# Patient Record
Sex: MALE | Race: WHITE | NOT HISPANIC OR LATINO | Employment: OTHER | URBAN - METROPOLITAN AREA
[De-identification: names, ages, dates, MRNs, and addresses within clinical notes are randomized per-mention and may not be internally consistent; named-entity substitution may affect disease eponyms.]

---

## 2017-01-24 LAB — CRC RECOMMENDATION EXT: NORMAL

## 2017-02-13 ENCOUNTER — HISTORICAL (OUTPATIENT)
Dept: ADMINISTRATIVE | Facility: HOSPITAL | Age: 67
End: 2017-02-13

## 2018-02-14 ENCOUNTER — HISTORICAL (OUTPATIENT)
Dept: ADMINISTRATIVE | Facility: HOSPITAL | Age: 68
End: 2018-02-14

## 2018-06-15 ENCOUNTER — HISTORICAL (OUTPATIENT)
Dept: ADMINISTRATIVE | Facility: HOSPITAL | Age: 68
End: 2018-06-15

## 2018-06-15 LAB
ABS NEUT (OLG): 6.65 X10(3)/MCL (ref 2.1–9.2)
ALBUMIN SERPL-MCNC: 4.1 GM/DL (ref 3.4–5)
ALBUMIN/GLOB SERPL: 1.3 {RATIO}
ALP SERPL-CCNC: 89 UNIT/L (ref 50–136)
ALT SERPL-CCNC: 10 UNIT/L (ref 12–78)
APPEARANCE, UA: ABNORMAL
AST SERPL-CCNC: 16 UNIT/L (ref 15–37)
BACTERIA SPEC CULT: ABNORMAL /HPF
BASOPHILS # BLD AUTO: 0 X10(3)/MCL (ref 0–0.2)
BASOPHILS NFR BLD AUTO: 0 %
BILIRUB SERPL-MCNC: 0.8 MG/DL (ref 0.2–1)
BILIRUB UR QL STRIP: ABNORMAL
BILIRUBIN DIRECT+TOT PNL SERPL-MCNC: 0.2 MG/DL (ref 0–0.2)
BILIRUBIN DIRECT+TOT PNL SERPL-MCNC: 0.6 MG/DL (ref 0–0.8)
BUN SERPL-MCNC: 16 MG/DL (ref 7–18)
CALCIUM SERPL-MCNC: 8.8 MG/DL (ref 8.5–10.1)
CHLORIDE SERPL-SCNC: 106 MMOL/L (ref 98–107)
CHOLEST SERPL-MCNC: 170 MG/DL (ref 0–200)
CHOLEST/HDLC SERPL: 3.9 {RATIO} (ref 0–5)
CO2 SERPL-SCNC: 28 MMOL/L (ref 21–32)
COLOR UR: ABNORMAL
CREAT SERPL-MCNC: 1.17 MG/DL (ref 0.7–1.3)
EOSINOPHIL # BLD AUTO: 0.2 X10(3)/MCL (ref 0–0.9)
EOSINOPHIL NFR BLD AUTO: 2 %
ERYTHROCYTE [DISTWIDTH] IN BLOOD BY AUTOMATED COUNT: 12.7 % (ref 11.5–17)
GLOBULIN SER-MCNC: 3.2 GM/DL (ref 2.4–3.5)
GLUCOSE (UA): NEGATIVE
GLUCOSE SERPL-MCNC: 86 MG/DL (ref 74–106)
HCT VFR BLD AUTO: 44.6 % (ref 42–52)
HDLC SERPL-MCNC: 44 MG/DL (ref 35–60)
HGB BLD-MCNC: 14.2 GM/DL (ref 14–18)
HGB UR QL STRIP: NEGATIVE
KETONES UR QL STRIP: ABNORMAL
LDLC SERPL CALC-MCNC: 105 MG/DL (ref 0–129)
LEUKOCYTE ESTERASE UR QL STRIP: ABNORMAL
LYMPHOCYTES # BLD AUTO: 1.6 X10(3)/MCL (ref 0.6–4.6)
LYMPHOCYTES NFR BLD AUTO: 18 %
MCH RBC QN AUTO: 29.8 PG (ref 27–31)
MCHC RBC AUTO-ENTMCNC: 31.8 GM/DL (ref 33–36)
MCV RBC AUTO: 93.5 FL (ref 80–94)
MONOCYTES # BLD AUTO: 0.5 X10(3)/MCL (ref 0.1–1.3)
MONOCYTES NFR BLD AUTO: 6 %
NEUTROPHILS # BLD AUTO: 6.65 X10(3)/MCL (ref 2.1–9.2)
NEUTROPHILS NFR BLD AUTO: 74 %
NITRITE UR QL STRIP: POSITIVE
PH UR STRIP: 6 [PH] (ref 5–9)
PLATELET # BLD AUTO: 216 X10(3)/MCL (ref 130–400)
PMV BLD AUTO: 9.8 FL (ref 9.4–12.4)
POTASSIUM SERPL-SCNC: 4.2 MMOL/L (ref 3.5–5.1)
PROT SERPL-MCNC: 7.3 GM/DL (ref 6.4–8.2)
PROT UR QL STRIP: ABNORMAL
RBC # BLD AUTO: 4.77 X10(6)/MCL (ref 4.7–6.1)
RBC #/AREA URNS HPF: ABNORMAL /[HPF]
SODIUM SERPL-SCNC: 143 MMOL/L (ref 136–145)
SP GR UR STRIP: 1.02 (ref 1–1.03)
SQUAMOUS EPITHELIAL, UA: ABNORMAL
TRIGL SERPL-MCNC: 106 MG/DL (ref 30–150)
TSH SERPL-ACNC: 1.62 MIU/L (ref 0.36–3.74)
UROBILINOGEN UR STRIP-ACNC: 0.2
VLDLC SERPL CALC-MCNC: 21 MG/DL
WBC # SPEC AUTO: 9 X10(3)/MCL (ref 4.5–11.5)
WBC #/AREA URNS HPF: ABNORMAL /HPF

## 2019-02-20 ENCOUNTER — HISTORICAL (OUTPATIENT)
Dept: ADMINISTRATIVE | Facility: HOSPITAL | Age: 69
End: 2019-02-20

## 2019-02-20 LAB
ABS NEUT (OLG): 6.88 X10(3)/MCL (ref 2.1–9.2)
ALBUMIN SERPL-MCNC: 3.8 GM/DL (ref 3.4–5)
ALBUMIN/GLOB SERPL: 1.2 RATIO (ref 1.1–2)
ALP SERPL-CCNC: 123 UNIT/L (ref 50–136)
ALT SERPL-CCNC: 19 UNIT/L (ref 12–78)
APPEARANCE, UA: CLEAR
AST SERPL-CCNC: 13 UNIT/L (ref 15–37)
BACTERIA SPEC CULT: ABNORMAL /HPF
BASOPHILS # BLD AUTO: 0.1 X10(3)/MCL (ref 0–0.2)
BASOPHILS NFR BLD AUTO: 1 %
BILIRUB SERPL-MCNC: 0.4 MG/DL (ref 0.2–1)
BILIRUB UR QL STRIP: NEGATIVE
BILIRUBIN DIRECT+TOT PNL SERPL-MCNC: 0.1 MG/DL (ref 0–0.5)
BILIRUBIN DIRECT+TOT PNL SERPL-MCNC: 0.3 MG/DL (ref 0–0.8)
BUN SERPL-MCNC: 21 MG/DL (ref 7–18)
CALCIUM SERPL-MCNC: 8.9 MG/DL (ref 8.5–10.1)
CHLORIDE SERPL-SCNC: 107 MMOL/L (ref 98–107)
CHOLEST SERPL-MCNC: 180 MG/DL (ref 0–200)
CHOLEST/HDLC SERPL: 3.8 {RATIO} (ref 0–5)
CO2 SERPL-SCNC: 29 MMOL/L (ref 21–32)
COLOR UR: YELLOW
CREAT SERPL-MCNC: 1.28 MG/DL (ref 0.7–1.3)
EOSINOPHIL # BLD AUTO: 0.3 X10(3)/MCL (ref 0–0.9)
EOSINOPHIL NFR BLD AUTO: 3 %
ERYTHROCYTE [DISTWIDTH] IN BLOOD BY AUTOMATED COUNT: 13 % (ref 11.5–17)
GLOBULIN SER-MCNC: 3.2 GM/DL (ref 2.4–3.5)
GLUCOSE (UA): NEGATIVE
GLUCOSE SERPL-MCNC: 109 MG/DL (ref 74–106)
HCT VFR BLD AUTO: 41.2 % (ref 42–52)
HDLC SERPL-MCNC: 47 MG/DL (ref 35–60)
HGB BLD-MCNC: 13 GM/DL (ref 14–18)
HGB UR QL STRIP: NEGATIVE
KETONES UR QL STRIP: ABNORMAL
LDLC SERPL CALC-MCNC: 112 MG/DL (ref 0–129)
LEUKOCYTE ESTERASE UR QL STRIP: NEGATIVE
LYMPHOCYTES # BLD AUTO: 1.4 X10(3)/MCL (ref 0.6–4.6)
LYMPHOCYTES NFR BLD AUTO: 15 %
MCH RBC QN AUTO: 29.7 PG (ref 27–31)
MCHC RBC AUTO-ENTMCNC: 31.6 GM/DL (ref 33–36)
MCV RBC AUTO: 94.3 FL (ref 80–94)
MONOCYTES # BLD AUTO: 0.7 X10(3)/MCL (ref 0.1–1.3)
MONOCYTES NFR BLD AUTO: 7 %
NEUTROPHILS # BLD AUTO: 6.88 X10(3)/MCL (ref 2.1–9.2)
NEUTROPHILS NFR BLD AUTO: 74 %
NITRITE UR QL STRIP: NEGATIVE
PH UR STRIP: 6.5 [PH] (ref 5–9)
PLATELET # BLD AUTO: 219 X10(3)/MCL (ref 130–400)
PMV BLD AUTO: 10.3 FL (ref 9.4–12.4)
POTASSIUM SERPL-SCNC: 4.7 MMOL/L (ref 3.5–5.1)
PROT SERPL-MCNC: 7 GM/DL (ref 6.4–8.2)
PROT UR QL STRIP: NEGATIVE
RBC # BLD AUTO: 4.37 X10(6)/MCL (ref 4.7–6.1)
RBC #/AREA URNS HPF: ABNORMAL /[HPF]
SODIUM SERPL-SCNC: 140 MMOL/L (ref 136–145)
SP GR UR STRIP: 1.03 (ref 1–1.03)
SQUAMOUS EPITHELIAL, UA: ABNORMAL
T4 FREE SERPL-MCNC: 0.96 NG/DL (ref 0.76–1.46)
TRIGL SERPL-MCNC: 105 MG/DL (ref 30–150)
TSH SERPL-ACNC: 2.24 MIU/L (ref 0.36–3.74)
UROBILINOGEN UR STRIP-ACNC: 1
VLDLC SERPL CALC-MCNC: 21 MG/DL
WBC # SPEC AUTO: 9.3 X10(3)/MCL (ref 4.5–11.5)
WBC #/AREA URNS HPF: ABNORMAL /HPF

## 2019-06-25 ENCOUNTER — HISTORICAL (OUTPATIENT)
Dept: ADMINISTRATIVE | Facility: HOSPITAL | Age: 69
End: 2019-06-25

## 2019-06-25 LAB
ABS NEUT (OLG): 7.37 X10(3)/MCL (ref 2.1–9.2)
ALBUMIN SERPL-MCNC: 3.9 GM/DL (ref 3.4–5)
ALBUMIN/GLOB SERPL: 1.3 {RATIO}
ALP SERPL-CCNC: 132 UNIT/L (ref 50–136)
ALT SERPL-CCNC: 8 UNIT/L (ref 12–78)
APPEARANCE, UA: CLEAR
AST SERPL-CCNC: 17 UNIT/L (ref 15–37)
BACTERIA SPEC CULT: ABNORMAL /HPF
BASOPHILS # BLD AUTO: 0.1 X10(3)/MCL (ref 0–0.2)
BASOPHILS NFR BLD AUTO: 1 %
BILIRUB SERPL-MCNC: 0.7 MG/DL (ref 0.2–1)
BILIRUB UR QL STRIP: NEGATIVE
BILIRUBIN DIRECT+TOT PNL SERPL-MCNC: 0.1 MG/DL (ref 0–0.2)
BILIRUBIN DIRECT+TOT PNL SERPL-MCNC: 0.6 MG/DL (ref 0–0.8)
BUN SERPL-MCNC: 18 MG/DL (ref 7–18)
CALCIUM SERPL-MCNC: 8.9 MG/DL (ref 8.5–10.1)
CHLORIDE SERPL-SCNC: 105 MMOL/L (ref 98–107)
CHOLEST SERPL-MCNC: 172 MG/DL (ref 0–200)
CHOLEST/HDLC SERPL: 4.1 {RATIO} (ref 0–5)
CO2 SERPL-SCNC: 26 MMOL/L (ref 21–32)
COLOR UR: YELLOW
CREAT SERPL-MCNC: 1.28 MG/DL (ref 0.7–1.3)
EOSINOPHIL # BLD AUTO: 0.3 X10(3)/MCL (ref 0–0.9)
EOSINOPHIL NFR BLD AUTO: 3 %
ERYTHROCYTE [DISTWIDTH] IN BLOOD BY AUTOMATED COUNT: 13.2 % (ref 11.5–17)
EST. AVERAGE GLUCOSE BLD GHB EST-MCNC: 126 MG/DL
GLOBULIN SER-MCNC: 3.1 GM/DL (ref 2.4–3.5)
GLUCOSE (UA): NEGATIVE
GLUCOSE SERPL-MCNC: 97 MG/DL (ref 74–106)
HBA1C MFR BLD: 6 % (ref 4.2–6.3)
HCT VFR BLD AUTO: 41.5 % (ref 42–52)
HDLC SERPL-MCNC: 42 MG/DL (ref 35–60)
HGB BLD-MCNC: 13.2 GM/DL (ref 14–18)
HGB UR QL STRIP: NEGATIVE
KETONES UR QL STRIP: ABNORMAL
LDLC SERPL CALC-MCNC: 100 MG/DL (ref 0–129)
LEUKOCYTE ESTERASE UR QL STRIP: NEGATIVE
LYMPHOCYTES # BLD AUTO: 1.2 X10(3)/MCL (ref 0.6–4.6)
LYMPHOCYTES NFR BLD AUTO: 13 %
MCH RBC QN AUTO: 28.6 PG (ref 27–31)
MCHC RBC AUTO-ENTMCNC: 31.8 GM/DL (ref 33–36)
MCV RBC AUTO: 90 FL (ref 80–94)
MONOCYTES # BLD AUTO: 0.6 X10(3)/MCL (ref 0.1–1.3)
MONOCYTES NFR BLD AUTO: 6 %
NEUTROPHILS # BLD AUTO: 7.37 X10(3)/MCL (ref 2.1–9.2)
NEUTROPHILS NFR BLD AUTO: 77 %
NITRITE UR QL STRIP: NEGATIVE
PH UR STRIP: 5.5 [PH] (ref 5–9)
PLATELET # BLD AUTO: 219 X10(3)/MCL (ref 130–400)
PMV BLD AUTO: 10 FL (ref 9.4–12.4)
POTASSIUM SERPL-SCNC: 4.6 MMOL/L (ref 3.5–5.1)
PROT SERPL-MCNC: 7 GM/DL (ref 6.4–8.2)
PROT UR QL STRIP: NEGATIVE
RBC # BLD AUTO: 4.61 X10(6)/MCL (ref 4.7–6.1)
RBC #/AREA URNS HPF: ABNORMAL /[HPF]
SODIUM SERPL-SCNC: 138 MMOL/L (ref 136–145)
SP GR UR STRIP: 1.02 (ref 1–1.03)
SQUAMOUS EPITHELIAL, UA: ABNORMAL
TRIGL SERPL-MCNC: 152 MG/DL (ref 30–150)
TSH SERPL-ACNC: 1.89 MIU/L (ref 0.36–3.74)
UROBILINOGEN UR STRIP-ACNC: 1
VLDLC SERPL CALC-MCNC: 30 MG/DL
WBC # SPEC AUTO: 9.6 X10(3)/MCL (ref 4.5–11.5)
WBC #/AREA URNS HPF: ABNORMAL /HPF

## 2019-10-04 ENCOUNTER — HISTORICAL (OUTPATIENT)
Dept: ADMINISTRATIVE | Facility: HOSPITAL | Age: 69
End: 2019-10-04

## 2019-10-04 LAB
APPEARANCE, UA: CLEAR
BACTERIA SPEC CULT: ABNORMAL /HPF
BILIRUB UR QL STRIP: NEGATIVE
COLOR UR: YELLOW
GLUCOSE (UA): NEGATIVE
HGB UR QL STRIP: NEGATIVE
KETONES UR QL STRIP: ABNORMAL
LEUKOCYTE ESTERASE UR QL STRIP: NEGATIVE
NITRITE UR QL STRIP: NEGATIVE
PH UR STRIP: 6.5 [PH] (ref 5–9)
PROT UR QL STRIP: NEGATIVE
RBC #/AREA URNS HPF: ABNORMAL /[HPF]
SP GR UR STRIP: 1.02 (ref 1–1.03)
SQUAMOUS EPITHELIAL, UA: ABNORMAL
UROBILINOGEN UR STRIP-ACNC: 1
WBC #/AREA URNS HPF: ABNORMAL /HPF

## 2019-11-27 ENCOUNTER — HISTORICAL (OUTPATIENT)
Dept: ADMINISTRATIVE | Facility: HOSPITAL | Age: 69
End: 2019-11-27

## 2019-11-27 LAB
ABS NEUT (OLG): 7.67 X10(3)/MCL (ref 2.1–9.2)
ALBUMIN SERPL-MCNC: 3.7 GM/DL (ref 3.4–5)
ALBUMIN/GLOB SERPL: 1.2 RATIO (ref 1.1–2)
ALP SERPL-CCNC: 100 UNIT/L (ref 50–136)
ALT SERPL-CCNC: 10 UNIT/L (ref 12–78)
AST SERPL-CCNC: 19 UNIT/L (ref 15–37)
BASOPHILS # BLD AUTO: 0 X10(3)/MCL (ref 0–0.2)
BASOPHILS NFR BLD AUTO: 0 %
BILIRUB SERPL-MCNC: 0.4 MG/DL (ref 0.2–1)
BILIRUBIN DIRECT+TOT PNL SERPL-MCNC: 0.1 MG/DL (ref 0–0.5)
BILIRUBIN DIRECT+TOT PNL SERPL-MCNC: 0.3 MG/DL (ref 0–0.8)
BUN SERPL-MCNC: 20 MG/DL (ref 7–18)
CALCIUM SERPL-MCNC: 8.8 MG/DL (ref 8.5–10.1)
CHLORIDE SERPL-SCNC: 105 MMOL/L (ref 98–107)
CO2 SERPL-SCNC: 27 MMOL/L (ref 21–32)
CREAT SERPL-MCNC: 1.05 MG/DL (ref 0.7–1.3)
EOSINOPHIL # BLD AUTO: 0.2 X10(3)/MCL (ref 0–0.9)
EOSINOPHIL NFR BLD AUTO: 2 %
ERYTHROCYTE [DISTWIDTH] IN BLOOD BY AUTOMATED COUNT: 13.8 % (ref 11.5–17)
GLOBULIN SER-MCNC: 3 GM/DL (ref 2.4–3.5)
GLUCOSE SERPL-MCNC: 108 MG/DL (ref 74–106)
HCT VFR BLD AUTO: 39.8 % (ref 42–52)
HGB BLD-MCNC: 12.5 GM/DL (ref 14–18)
HIV 1+2 AB+HIV1 P24 AG SERPL QL IA: NEGATIVE
LYMPHOCYTES # BLD AUTO: 1.4 X10(3)/MCL (ref 0.6–4.6)
LYMPHOCYTES NFR BLD AUTO: 15 %
MCH RBC QN AUTO: 28.5 PG (ref 27–31)
MCHC RBC AUTO-ENTMCNC: 31.4 GM/DL (ref 33–36)
MCV RBC AUTO: 90.7 FL (ref 80–94)
MONOCYTES # BLD AUTO: 0.5 X10(3)/MCL (ref 0.1–1.3)
MONOCYTES NFR BLD AUTO: 5 %
NEUTROPHILS # BLD AUTO: 7.67 X10(3)/MCL (ref 2.1–9.2)
NEUTROPHILS NFR BLD AUTO: 77 %
PLATELET # BLD AUTO: 192 X10(3)/MCL (ref 130–400)
PMV BLD AUTO: 9.9 FL (ref 9.4–12.4)
POTASSIUM SERPL-SCNC: 4.1 MMOL/L (ref 3.5–5.1)
PROT SERPL-MCNC: 6.7 GM/DL (ref 6.4–8.2)
RBC # BLD AUTO: 4.39 X10(6)/MCL (ref 4.7–6.1)
SODIUM SERPL-SCNC: 140 MMOL/L (ref 136–145)
WBC # SPEC AUTO: 9.9 X10(3)/MCL (ref 4.5–11.5)

## 2019-12-16 ENCOUNTER — HISTORICAL (OUTPATIENT)
Dept: ADMINISTRATIVE | Facility: HOSPITAL | Age: 69
End: 2019-12-16

## 2019-12-16 LAB
ABS NEUT (OLG): 6.05 X10(3)/MCL (ref 2.1–9.2)
ALBUMIN SERPL-MCNC: 3.9 GM/DL (ref 3.4–5)
ALBUMIN/GLOB SERPL: 1.3 {RATIO}
ALP SERPL-CCNC: 101 UNIT/L (ref 50–136)
ALT SERPL-CCNC: 9 UNIT/L (ref 12–78)
APPEARANCE, UA: CLEAR
AST SERPL-CCNC: 13 UNIT/L (ref 15–37)
BACTERIA SPEC CULT: ABNORMAL /HPF
BASOPHILS # BLD AUTO: 0 X10(3)/MCL (ref 0–0.2)
BASOPHILS NFR BLD AUTO: 1 %
BILIRUB SERPL-MCNC: 0.6 MG/DL (ref 0.2–1)
BILIRUB UR QL STRIP: ABNORMAL
BILIRUBIN DIRECT+TOT PNL SERPL-MCNC: 0.1 MG/DL (ref 0–0.2)
BILIRUBIN DIRECT+TOT PNL SERPL-MCNC: 0.5 MG/DL (ref 0–0.8)
BUN SERPL-MCNC: 14 MG/DL (ref 7–18)
CALCIUM SERPL-MCNC: 9.1 MG/DL (ref 8.5–10.1)
CHLORIDE SERPL-SCNC: 105 MMOL/L (ref 98–107)
CHOLEST SERPL-MCNC: 187 MG/DL (ref 0–200)
CHOLEST/HDLC SERPL: 4.9 {RATIO} (ref 0–5)
CO2 SERPL-SCNC: 29 MMOL/L (ref 21–32)
COLOR UR: YELLOW
CREAT SERPL-MCNC: 0.9 MG/DL (ref 0.7–1.3)
EOSINOPHIL # BLD AUTO: 0.3 X10(3)/MCL (ref 0–0.9)
EOSINOPHIL NFR BLD AUTO: 3 %
ERYTHROCYTE [DISTWIDTH] IN BLOOD BY AUTOMATED COUNT: 13.6 % (ref 11.5–17)
EST. AVERAGE GLUCOSE BLD GHB EST-MCNC: 126 MG/DL
GLOBULIN SER-MCNC: 3.1 GM/DL (ref 2.4–3.5)
GLUCOSE (UA): NEGATIVE
GLUCOSE SERPL-MCNC: 97 MG/DL (ref 74–106)
HBA1C MFR BLD: 6 % (ref 4.2–6.3)
HCT VFR BLD AUTO: 40.7 % (ref 42–52)
HDLC SERPL-MCNC: 38 MG/DL (ref 35–60)
HGB BLD-MCNC: 12.6 GM/DL (ref 14–18)
HGB UR QL STRIP: NEGATIVE
KETONES UR QL STRIP: ABNORMAL
LDLC SERPL CALC-MCNC: 122 MG/DL (ref 0–129)
LEUKOCYTE ESTERASE UR QL STRIP: ABNORMAL
LYMPHOCYTES # BLD AUTO: 1.4 X10(3)/MCL (ref 0.6–4.6)
LYMPHOCYTES NFR BLD AUTO: 17 %
MCH RBC QN AUTO: 28.4 PG (ref 27–31)
MCHC RBC AUTO-ENTMCNC: 31 GM/DL (ref 33–36)
MCV RBC AUTO: 91.7 FL (ref 80–94)
MONOCYTES # BLD AUTO: 0.5 X10(3)/MCL (ref 0.1–1.3)
MONOCYTES NFR BLD AUTO: 6 %
NEUTROPHILS # BLD AUTO: 6.05 X10(3)/MCL (ref 2.1–9.2)
NEUTROPHILS NFR BLD AUTO: 73 %
NITRITE UR QL STRIP: NEGATIVE
PH UR STRIP: 8 [PH] (ref 5–9)
PLATELET # BLD AUTO: 216 X10(3)/MCL (ref 130–400)
PMV BLD AUTO: 9.9 FL (ref 9.4–12.4)
POTASSIUM SERPL-SCNC: 4.1 MMOL/L (ref 3.5–5.1)
PROT SERPL-MCNC: 7 GM/DL (ref 6.4–8.2)
PROT UR QL STRIP: ABNORMAL
RBC # BLD AUTO: 4.44 X10(6)/MCL (ref 4.7–6.1)
RBC #/AREA URNS HPF: ABNORMAL /[HPF]
SODIUM SERPL-SCNC: 139 MMOL/L (ref 136–145)
SP GR UR STRIP: 1.02 (ref 1–1.03)
SQUAMOUS EPITHELIAL, UA: ABNORMAL
TRIGL SERPL-MCNC: 137 MG/DL (ref 30–150)
TSH SERPL-ACNC: 0.9 MIU/L (ref 0.36–3.74)
UROBILINOGEN UR STRIP-ACNC: 1
VLDLC SERPL CALC-MCNC: 27 MG/DL
WBC # SPEC AUTO: 8.3 X10(3)/MCL (ref 4.5–11.5)
WBC #/AREA URNS HPF: ABNORMAL /[HPF]

## 2019-12-23 ENCOUNTER — HISTORICAL (OUTPATIENT)
Dept: ADMINISTRATIVE | Facility: HOSPITAL | Age: 69
End: 2019-12-23

## 2019-12-23 LAB — HBV SURFACE AG SERPL QL IA: NEGATIVE

## 2020-09-23 ENCOUNTER — HISTORICAL (OUTPATIENT)
Dept: ADMINISTRATIVE | Facility: HOSPITAL | Age: 70
End: 2020-09-23

## 2020-09-23 LAB
ABS NEUT (OLG): 4.86 X10(3)/MCL (ref 2.1–9.2)
ALBUMIN SERPL-MCNC: 3.8 GM/DL (ref 3.4–5)
ALBUMIN/GLOB SERPL: 1.6 RATIO (ref 1.1–2)
ALP SERPL-CCNC: 114 UNIT/L (ref 40–150)
ALT SERPL-CCNC: 8 UNIT/L (ref 0–55)
APPEARANCE, UA: CLEAR
AST SERPL-CCNC: 13 UNIT/L (ref 5–34)
BACTERIA SPEC CULT: ABNORMAL /HPF
BASOPHILS # BLD AUTO: 0 X10(3)/MCL (ref 0–0.2)
BASOPHILS NFR BLD AUTO: 1 %
BILIRUB SERPL-MCNC: 0.5 MG/DL
BILIRUB UR QL STRIP: NEGATIVE
BILIRUBIN DIRECT+TOT PNL SERPL-MCNC: 0.2 MG/DL (ref 0–0.5)
BILIRUBIN DIRECT+TOT PNL SERPL-MCNC: 0.3 MG/DL (ref 0–0.8)
BUN SERPL-MCNC: 13.2 MG/DL (ref 8.4–25.7)
CALCIUM SERPL-MCNC: 8.4 MG/DL (ref 8.8–10)
CHLORIDE SERPL-SCNC: 104 MMOL/L (ref 98–107)
CHOLEST SERPL-MCNC: 141 MG/DL
CHOLEST/HDLC SERPL: 4 {RATIO} (ref 0–5)
CO2 SERPL-SCNC: 30 MMOL/L (ref 23–31)
COLOR UR: ABNORMAL
CREAT SERPL-MCNC: 0.87 MG/DL (ref 0.73–1.18)
EOSINOPHIL # BLD AUTO: 0.3 X10(3)/MCL (ref 0–0.9)
EOSINOPHIL NFR BLD AUTO: 4 %
ERYTHROCYTE [DISTWIDTH] IN BLOOD BY AUTOMATED COUNT: 12.9 % (ref 11.5–17)
GLOBULIN SER-MCNC: 2.4 GM/DL (ref 2.4–3.5)
GLUCOSE (UA): NEGATIVE
GLUCOSE SERPL-MCNC: 111 MG/DL (ref 82–115)
HCT VFR BLD AUTO: 40.6 % (ref 42–52)
HDLC SERPL-MCNC: 33 MG/DL (ref 35–60)
HGB BLD-MCNC: 12.9 GM/DL (ref 14–18)
HGB UR QL STRIP: NEGATIVE
KETONES UR QL STRIP: ABNORMAL
LDLC SERPL CALC-MCNC: 84 MG/DL (ref 50–140)
LEUKOCYTE ESTERASE UR QL STRIP: NEGATIVE
LYMPHOCYTES # BLD AUTO: 1.7 X10(3)/MCL (ref 0.6–4.6)
LYMPHOCYTES NFR BLD AUTO: 23 %
MCH RBC QN AUTO: 29.5 PG (ref 27–31)
MCHC RBC AUTO-ENTMCNC: 31.8 GM/DL (ref 33–36)
MCV RBC AUTO: 92.9 FL (ref 80–94)
MONOCYTES # BLD AUTO: 0.4 X10(3)/MCL (ref 0.1–1.3)
MONOCYTES NFR BLD AUTO: 6 %
NEUTROPHILS # BLD AUTO: 4.86 X10(3)/MCL (ref 2.1–9.2)
NEUTROPHILS NFR BLD AUTO: 66 %
NITRITE UR QL STRIP: NEGATIVE
PH UR STRIP: 6.5 [PH] (ref 5–9)
PLATELET # BLD AUTO: 203 X10(3)/MCL (ref 130–400)
PMV BLD AUTO: 10.4 FL (ref 9.4–12.4)
POTASSIUM SERPL-SCNC: 4.2 MMOL/L (ref 3.5–5.1)
PROT SERPL-MCNC: 6.2 GM/DL (ref 5.8–7.6)
PROT UR QL STRIP: ABNORMAL
RBC # BLD AUTO: 4.37 X10(6)/MCL (ref 4.7–6.1)
RBC #/AREA URNS HPF: ABNORMAL /[HPF]
SODIUM SERPL-SCNC: 139 MMOL/L (ref 136–145)
SP GR UR STRIP: 1.03 (ref 1–1.03)
SQUAMOUS EPITHELIAL, UA: ABNORMAL
TRIGL SERPL-MCNC: 119 MG/DL (ref 34–140)
TSH SERPL-ACNC: 0.72 UIU/ML (ref 0.35–4.94)
UROBILINOGEN UR STRIP-ACNC: 1
VLDLC SERPL CALC-MCNC: 24 MG/DL
WBC # SPEC AUTO: 7.3 X10(3)/MCL (ref 4.5–11.5)
WBC #/AREA URNS HPF: ABNORMAL /HPF

## 2020-12-17 ENCOUNTER — HISTORICAL (OUTPATIENT)
Dept: ADMINISTRATIVE | Facility: HOSPITAL | Age: 70
End: 2020-12-17

## 2020-12-17 LAB
ABS NEUT (OLG): 5.64 X10(3)/MCL (ref 2.1–9.2)
ALBUMIN SERPL-MCNC: 4.1 GM/DL (ref 3.4–4.8)
ALBUMIN/GLOB SERPL: 1.6 RATIO (ref 1.1–2)
ALP SERPL-CCNC: 115 UNIT/L (ref 40–150)
ALT SERPL-CCNC: 5 UNIT/L (ref 0–55)
AST SERPL-CCNC: 13 UNIT/L (ref 5–34)
BASOPHILS # BLD AUTO: 0 X10(3)/MCL (ref 0–0.2)
BASOPHILS NFR BLD AUTO: 1 %
BILIRUB SERPL-MCNC: 0.4 MG/DL
BILIRUBIN DIRECT+TOT PNL SERPL-MCNC: 0.1 MG/DL (ref 0–0.8)
BILIRUBIN DIRECT+TOT PNL SERPL-MCNC: 0.3 MG/DL (ref 0–0.5)
BUN SERPL-MCNC: 17.9 MG/DL (ref 8.4–25.7)
CALCIUM SERPL-MCNC: 8.4 MG/DL (ref 8.8–10)
CHLORIDE SERPL-SCNC: 108 MMOL/L (ref 98–107)
CO2 SERPL-SCNC: 29 MMOL/L (ref 23–31)
CREAT SERPL-MCNC: 1.05 MG/DL (ref 0.73–1.18)
EOSINOPHIL # BLD AUTO: 0.3 X10(3)/MCL (ref 0–0.9)
EOSINOPHIL NFR BLD AUTO: 3 %
ERYTHROCYTE [DISTWIDTH] IN BLOOD BY AUTOMATED COUNT: 12.9 % (ref 11.5–17)
GLOBULIN SER-MCNC: 2.5 GM/DL (ref 2.4–3.5)
GLUCOSE SERPL-MCNC: 112 MG/DL (ref 82–115)
HCT VFR BLD AUTO: 41.8 % (ref 42–52)
HGB BLD-MCNC: 13.2 GM/DL (ref 14–18)
LYMPHOCYTES # BLD AUTO: 1.7 X10(3)/MCL (ref 0.6–4.6)
LYMPHOCYTES NFR BLD AUTO: 21 %
MCH RBC QN AUTO: 29.8 PG (ref 27–31)
MCHC RBC AUTO-ENTMCNC: 31.6 GM/DL (ref 33–36)
MCV RBC AUTO: 94.4 FL (ref 80–94)
MONOCYTES # BLD AUTO: 0.4 X10(3)/MCL (ref 0.1–1.3)
MONOCYTES NFR BLD AUTO: 5 %
NEUTROPHILS # BLD AUTO: 5.64 X10(3)/MCL (ref 2.1–9.2)
NEUTROPHILS NFR BLD AUTO: 70 %
PLATELET # BLD AUTO: 212 X10(3)/MCL (ref 130–400)
PMV BLD AUTO: 10.7 FL (ref 9.4–12.4)
POTASSIUM SERPL-SCNC: 4.1 MMOL/L (ref 3.5–5.1)
PROT SERPL-MCNC: 6.6 GM/DL (ref 5.8–7.6)
RBC # BLD AUTO: 4.43 X10(6)/MCL (ref 4.7–6.1)
SODIUM SERPL-SCNC: 143 MMOL/L (ref 136–145)
WBC # SPEC AUTO: 8.1 X10(3)/MCL (ref 4.5–11.5)

## 2021-03-03 ENCOUNTER — HISTORICAL (OUTPATIENT)
Dept: INTENSIVE CARE | Facility: HOSPITAL | Age: 71
End: 2021-03-03

## 2021-03-11 ENCOUNTER — HISTORICAL (OUTPATIENT)
Dept: HEPATOLOGY | Facility: HOSPITAL | Age: 71
End: 2021-03-11

## 2021-07-13 ENCOUNTER — HISTORICAL (OUTPATIENT)
Dept: ADMINISTRATIVE | Facility: HOSPITAL | Age: 71
End: 2021-07-13

## 2021-07-13 LAB
ABS NEUT (OLG): 10.8 X10(3)/MCL (ref 2.1–9.2)
ALBUMIN SERPL-MCNC: 3.6 GM/DL (ref 3.4–4.8)
ALBUMIN/GLOB SERPL: 1.1 RATIO (ref 1.1–2)
ALP SERPL-CCNC: 99 UNIT/L (ref 40–150)
ALT SERPL-CCNC: <5 UNIT/L (ref 0–55)
AST SERPL-CCNC: 10 UNIT/L (ref 5–34)
BASOPHILS # BLD AUTO: 0 X10(3)/MCL (ref 0–0.2)
BASOPHILS NFR BLD AUTO: 0 %
BILIRUB SERPL-MCNC: 0.8 MG/DL
BILIRUBIN DIRECT+TOT PNL SERPL-MCNC: 0.3 MG/DL (ref 0–0.5)
BILIRUBIN DIRECT+TOT PNL SERPL-MCNC: 0.5 MG/DL (ref 0–0.8)
BUN SERPL-MCNC: 18.9 MG/DL (ref 8.4–25.7)
CALCIUM SERPL-MCNC: 9 MG/DL (ref 8.8–10)
CHLORIDE SERPL-SCNC: 102 MMOL/L (ref 98–107)
CO2 SERPL-SCNC: 29 MMOL/L (ref 23–31)
CREAT SERPL-MCNC: 1.13 MG/DL (ref 0.73–1.18)
EOSINOPHIL # BLD AUTO: 0.1 X10(3)/MCL (ref 0–0.9)
EOSINOPHIL NFR BLD AUTO: 1 %
ERYTHROCYTE [DISTWIDTH] IN BLOOD BY AUTOMATED COUNT: 13.2 % (ref 11.5–17)
GLOBULIN SER-MCNC: 3.3 GM/DL (ref 2.4–3.5)
GLUCOSE SERPL-MCNC: 101 MG/DL (ref 82–115)
HCT VFR BLD AUTO: 44.4 % (ref 42–52)
HGB BLD-MCNC: 14.3 GM/DL (ref 14–18)
LYMPHOCYTES # BLD AUTO: 1.8 X10(3)/MCL (ref 0.6–4.6)
LYMPHOCYTES NFR BLD AUTO: 13 %
MCH RBC QN AUTO: 29.5 PG (ref 27–31)
MCHC RBC AUTO-ENTMCNC: 32.2 GM/DL (ref 33–36)
MCV RBC AUTO: 91.7 FL (ref 80–94)
MONOCYTES # BLD AUTO: 0.6 X10(3)/MCL (ref 0.1–1.3)
MONOCYTES NFR BLD AUTO: 5 %
NEUTROPHILS # BLD AUTO: 10.8 X10(3)/MCL (ref 2.1–9.2)
NEUTROPHILS NFR BLD AUTO: 80 %
PLATELET # BLD AUTO: 286 X10(3)/MCL (ref 130–400)
PMV BLD AUTO: 10 FL (ref 9.4–12.4)
POTASSIUM SERPL-SCNC: 4.4 MMOL/L (ref 3.5–5.1)
PROT SERPL-MCNC: 6.9 GM/DL (ref 5.8–7.6)
RBC # BLD AUTO: 4.84 X10(6)/MCL (ref 4.7–6.1)
SODIUM SERPL-SCNC: 141 MMOL/L (ref 136–145)
WBC # SPEC AUTO: 13.4 X10(3)/MCL (ref 4.5–11.5)

## 2021-10-01 ENCOUNTER — HISTORICAL (OUTPATIENT)
Dept: ADMINISTRATIVE | Facility: HOSPITAL | Age: 71
End: 2021-10-01

## 2021-10-01 LAB
ABS NEUT (OLG): 7.07 X10(3)/MCL (ref 2.1–9.2)
ALBUMIN SERPL-MCNC: 4.1 GM/DL (ref 3.4–4.8)
ALBUMIN/GLOB SERPL: 1.4 RATIO (ref 1.1–2)
ALP SERPL-CCNC: 111 UNIT/L (ref 40–150)
ALT SERPL-CCNC: 5 UNIT/L (ref 0–55)
APPEARANCE, UA: CLEAR
AST SERPL-CCNC: 17 UNIT/L (ref 5–34)
BACTERIA SPEC CULT: ABNORMAL /HPF
BASOPHILS # BLD AUTO: 0 X10(3)/MCL (ref 0–0.2)
BASOPHILS NFR BLD AUTO: 0 %
BILIRUB SERPL-MCNC: 0.7 MG/DL
BILIRUB UR QL STRIP: ABNORMAL
BILIRUBIN DIRECT+TOT PNL SERPL-MCNC: 0.3 MG/DL (ref 0–0.5)
BILIRUBIN DIRECT+TOT PNL SERPL-MCNC: 0.4 MG/DL (ref 0–0.8)
BUN SERPL-MCNC: 17 MG/DL (ref 8.4–25.7)
CALCIUM SERPL-MCNC: 9.4 MG/DL (ref 8.8–10)
CHLORIDE SERPL-SCNC: 103 MMOL/L (ref 98–107)
CHOLEST SERPL-MCNC: 151 MG/DL
CHOLEST/HDLC SERPL: 4 {RATIO} (ref 0–5)
CO2 SERPL-SCNC: 26 MMOL/L (ref 23–31)
COLOR UR: ABNORMAL
CREAT SERPL-MCNC: 1.15 MG/DL (ref 0.73–1.18)
EOSINOPHIL # BLD AUTO: 0.2 X10(3)/MCL (ref 0–0.9)
EOSINOPHIL NFR BLD AUTO: 2 %
ERYTHROCYTE [DISTWIDTH] IN BLOOD BY AUTOMATED COUNT: 13.3 % (ref 11.5–17)
EST. AVERAGE GLUCOSE BLD GHB EST-MCNC: 125.5 MG/DL
FERRITIN SERPL-MCNC: 143.4 NG/ML (ref 21.81–274.66)
GLOBULIN SER-MCNC: 2.9 GM/DL (ref 2.4–3.5)
GLUCOSE (UA): NEGATIVE
GLUCOSE SERPL-MCNC: 103 MG/DL (ref 82–115)
HBA1C MFR BLD: 6 %
HCT VFR BLD AUTO: 43.2 % (ref 42–52)
HDLC SERPL-MCNC: 40 MG/DL (ref 35–60)
HGB BLD-MCNC: 14 GM/DL (ref 14–18)
HGB UR QL STRIP: NEGATIVE
IRON SATN MFR SERPL: 19 % (ref 20–50)
IRON SERPL-MCNC: 59 UG/DL (ref 65–175)
KETONES UR QL STRIP: ABNORMAL
LDLC SERPL CALC-MCNC: 92 MG/DL (ref 50–140)
LEUKOCYTE ESTERASE UR QL STRIP: NEGATIVE
LYMPHOCYTES # BLD AUTO: 1.4 X10(3)/MCL (ref 0.6–4.6)
LYMPHOCYTES NFR BLD AUTO: 15 %
MCH RBC QN AUTO: 29 PG (ref 27–31)
MCHC RBC AUTO-ENTMCNC: 32.4 GM/DL (ref 33–36)
MCV RBC AUTO: 89.6 FL (ref 80–94)
MONOCYTES # BLD AUTO: 0.5 X10(3)/MCL (ref 0.1–1.3)
MONOCYTES NFR BLD AUTO: 5 %
NEUTROPHILS # BLD AUTO: 7.07 X10(3)/MCL (ref 2.1–9.2)
NEUTROPHILS NFR BLD AUTO: 77 %
NITRITE UR QL STRIP: NEGATIVE
PH UR STRIP: 6 [PH] (ref 5–9)
PLATELET # BLD AUTO: 291 X10(3)/MCL (ref 130–400)
PMV BLD AUTO: 10.3 FL (ref 9.4–12.4)
POTASSIUM SERPL-SCNC: 4.5 MMOL/L (ref 3.5–5.1)
PROT SERPL-MCNC: 7 GM/DL (ref 5.8–7.6)
PROT UR QL STRIP: ABNORMAL
RBC # BLD AUTO: 4.82 X10(6)/MCL (ref 4.7–6.1)
RBC #/AREA URNS HPF: ABNORMAL /[HPF]
SODIUM SERPL-SCNC: 140 MMOL/L (ref 136–145)
SP GR UR STRIP: 1.03 (ref 1–1.03)
SQUAMOUS EPITHELIAL, UA: ABNORMAL /HPF (ref 0–4)
TIBC SERPL-MCNC: 245 UG/DL (ref 69–240)
TIBC SERPL-MCNC: 304 UG/DL (ref 250–450)
TRANSFERRIN SERPL-MCNC: 281 MG/DL (ref 163–344)
TRIGL SERPL-MCNC: 97 MG/DL (ref 34–140)
TSH SERPL-ACNC: 1.4 UIU/ML (ref 0.35–4.94)
UROBILINOGEN UR STRIP-ACNC: 1
VIT B12 SERPL-MCNC: 212 PG/ML (ref 213–816)
VLDLC SERPL CALC-MCNC: 19 MG/DL
WBC # SPEC AUTO: 9.2 X10(3)/MCL (ref 4.5–11.5)
WBC #/AREA URNS HPF: ABNORMAL /HPF

## 2022-02-18 ENCOUNTER — HISTORICAL (OUTPATIENT)
Dept: PREADMISSION TESTING | Facility: HOSPITAL | Age: 72
End: 2022-02-18

## 2022-02-18 LAB — SARS-COV-2 AG RESP QL IA.RAPID: NEGATIVE

## 2022-02-21 ENCOUNTER — HISTORICAL (OUTPATIENT)
Dept: ADMINISTRATIVE | Facility: HOSPITAL | Age: 72
End: 2022-02-21

## 2022-04-07 ENCOUNTER — HISTORICAL (OUTPATIENT)
Dept: ADMINISTRATIVE | Facility: HOSPITAL | Age: 72
End: 2022-04-07

## 2022-04-10 ENCOUNTER — HISTORICAL (OUTPATIENT)
Dept: ADMINISTRATIVE | Facility: HOSPITAL | Age: 72
End: 2022-04-10
Payer: MEDICARE

## 2022-04-18 ENCOUNTER — EXTERNAL HOSPITAL ADMISSION (OUTPATIENT)
Dept: ADMINISTRATIVE | Facility: CLINIC | Age: 72
End: 2022-04-18
Payer: MEDICARE

## 2022-04-18 ENCOUNTER — PATIENT OUTREACH (OUTPATIENT)
Dept: ADMINISTRATIVE | Facility: CLINIC | Age: 72
End: 2022-04-18
Payer: MEDICARE

## 2022-04-18 NOTE — PROGRESS NOTES
C3 nurse attempted to contact Buzz Fontenot for a TCC post hospital discharge follow up call. Patient requested call back.  The patient does not have a scheduled HOSFU appointment, and the pt does not have an [facility] PCP.

## 2022-04-27 VITALS
BODY MASS INDEX: 23.95 KG/M2 | HEIGHT: 68 IN | SYSTOLIC BLOOD PRESSURE: 110 MMHG | DIASTOLIC BLOOD PRESSURE: 66 MMHG | OXYGEN SATURATION: 98 % | WEIGHT: 158 LBS

## 2022-05-06 ENCOUNTER — HOSPITAL ENCOUNTER (INPATIENT)
Facility: HOSPITAL | Age: 72
LOS: 4 days | Discharge: HOSPICE | DRG: 312 | End: 2022-05-10
Attending: EMERGENCY MEDICINE | Admitting: INTERNAL MEDICINE
Payer: MEDICARE

## 2022-05-06 DIAGNOSIS — R55 SYNCOPE: ICD-10-CM

## 2022-05-06 DIAGNOSIS — F32.5 MAJOR DEPRESSION IN REMISSION: ICD-10-CM

## 2022-05-06 DIAGNOSIS — E78.5 HYPERLIPIDEMIA, UNSPECIFIED HYPERLIPIDEMIA TYPE: ICD-10-CM

## 2022-05-06 DIAGNOSIS — I10 HYPERTENSION, UNSPECIFIED TYPE: ICD-10-CM

## 2022-05-06 DIAGNOSIS — R55 SYNCOPE AND COLLAPSE: ICD-10-CM

## 2022-05-06 DIAGNOSIS — I95.1 ORTHOSTATIC HYPOTENSION: Primary | ICD-10-CM

## 2022-05-06 DIAGNOSIS — G20.A1 PARKINSON'S DISEASE: ICD-10-CM

## 2022-05-06 DIAGNOSIS — D64.9 CHRONIC ANEMIA: ICD-10-CM

## 2022-05-06 PROBLEM — I25.10 ARTERIOSCLEROSIS OF CORONARY ARTERY: Status: ACTIVE | Noted: 2022-05-06

## 2022-05-06 PROBLEM — R44.3 HALLUCINATIONS: Status: ACTIVE | Noted: 2022-05-06

## 2022-05-06 PROBLEM — I25.10 ARTERIOSCLEROSIS OF CORONARY ARTERY: Status: RESOLVED | Noted: 2022-05-06 | Resolved: 2022-05-06

## 2022-05-06 PROBLEM — R44.3 HALLUCINATIONS: Status: RESOLVED | Noted: 2022-05-06 | Resolved: 2022-05-06

## 2022-05-06 LAB
ALBUMIN SERPL-MCNC: 3.7 GM/DL (ref 3.4–4.8)
ALBUMIN/GLOB SERPL: 1.4 RATIO (ref 1.1–2)
ALP SERPL-CCNC: 183 UNIT/L (ref 40–150)
ALT SERPL-CCNC: <5 UNIT/L (ref 0–55)
AST SERPL-CCNC: 9 UNIT/L (ref 5–34)
BASOPHILS # BLD AUTO: 0.07 X10(3)/MCL (ref 0–0.2)
BASOPHILS NFR BLD AUTO: 0.6 %
BILIRUBIN DIRECT+TOT PNL SERPL-MCNC: 0.3 MG/DL (ref 0–0.5)
BILIRUBIN DIRECT+TOT PNL SERPL-MCNC: 0.4 MG/DL (ref 0–0.8)
BILIRUBIN DIRECT+TOT PNL SERPL-MCNC: 0.7 MG/DL
BUN SERPL-MCNC: 10.6 MG/DL (ref 8.4–25.7)
CALCIUM SERPL-MCNC: 8.8 MG/DL (ref 8.8–10)
CHLORIDE SERPL-SCNC: 106 MMOL/L (ref 98–107)
CO2 SERPL-SCNC: 26 MMOL/L (ref 23–31)
CREAT SERPL-MCNC: 0.85 MG/DL (ref 0.73–1.18)
EOSINOPHIL # BLD AUTO: 0.3 X10(3)/MCL (ref 0–0.9)
EOSINOPHIL NFR BLD AUTO: 2.5 %
ERYTHROCYTE [DISTWIDTH] IN BLOOD BY AUTOMATED COUNT: 13.6 % (ref 11.5–17)
GLOBULIN SER-MCNC: 2.6 GM/DL (ref 2.4–3.5)
GLUCOSE SERPL-MCNC: 77 MG/DL (ref 82–115)
HCT VFR BLD AUTO: 39.5 % (ref 42–52)
HGB BLD-MCNC: 12.4 GM/DL (ref 14–18)
IMM GRANULOCYTES # BLD AUTO: 0.1 X10(3)/MCL (ref 0–0.02)
IMM GRANULOCYTES NFR BLD AUTO: 0.8 % (ref 0–0.43)
LYMPHOCYTES # BLD AUTO: 1.66 X10(3)/MCL (ref 0.6–4.6)
LYMPHOCYTES NFR BLD AUTO: 13.9 %
MCH RBC QN AUTO: 29.5 PG (ref 27–31)
MCHC RBC AUTO-ENTMCNC: 31.4 MG/DL (ref 33–36)
MCV RBC AUTO: 93.8 FL (ref 80–94)
MONOCYTES # BLD AUTO: 0.89 X10(3)/MCL (ref 0.1–1.3)
MONOCYTES NFR BLD AUTO: 7.5 %
NEUTROPHILS # BLD AUTO: 8.9 X10(3)/MCL (ref 2.1–9.2)
NEUTROPHILS NFR BLD AUTO: 74.7 %
NRBC BLD AUTO-RTO: 0 %
PLATELET # BLD AUTO: 252 X10(3)/MCL (ref 130–400)
PMV BLD AUTO: 10.4 FL (ref 9.4–12.4)
POTASSIUM SERPL-SCNC: 4.5 MMOL/L (ref 3.5–5.1)
PROT SERPL-MCNC: 6.3 GM/DL (ref 5.8–7.6)
RBC # BLD AUTO: 4.21 X10(6)/MCL (ref 4.7–6.1)
SODIUM SERPL-SCNC: 139 MMOL/L (ref 136–145)
WBC # SPEC AUTO: 11.9 X10(3)/MCL (ref 4.5–11.5)

## 2022-05-06 PROCEDURE — 93010 ELECTROCARDIOGRAM REPORT: CPT | Mod: ,,, | Performed by: INTERNAL MEDICINE

## 2022-05-06 PROCEDURE — 93005 ELECTROCARDIOGRAM TRACING: CPT

## 2022-05-06 PROCEDURE — 99285 EMERGENCY DEPT VISIT HI MDM: CPT | Mod: 25

## 2022-05-06 PROCEDURE — 25000003 PHARM REV CODE 250: Performed by: INTERNAL MEDICINE

## 2022-05-06 PROCEDURE — 99223 1ST HOSP IP/OBS HIGH 75: CPT | Mod: AI,,, | Performed by: INTERNAL MEDICINE

## 2022-05-06 PROCEDURE — 93010 EKG 12-LEAD: ICD-10-PCS | Mod: ,,, | Performed by: INTERNAL MEDICINE

## 2022-05-06 PROCEDURE — 85025 COMPLETE CBC W/AUTO DIFF WBC: CPT | Performed by: EMERGENCY MEDICINE

## 2022-05-06 PROCEDURE — 80053 COMPREHEN METABOLIC PANEL: CPT | Performed by: EMERGENCY MEDICINE

## 2022-05-06 PROCEDURE — 36415 COLL VENOUS BLD VENIPUNCTURE: CPT | Performed by: EMERGENCY MEDICINE

## 2022-05-06 PROCEDURE — 11000001 HC ACUTE MED/SURG PRIVATE ROOM

## 2022-05-06 PROCEDURE — 99223 PR INITIAL HOSPITAL CARE,LEVL III: ICD-10-PCS | Mod: AI,,, | Performed by: INTERNAL MEDICINE

## 2022-05-06 RX ORDER — CLONIDINE HYDROCHLORIDE 0.1 MG/1
0.1 TABLET ORAL ONCE
Status: COMPLETED | OUTPATIENT
Start: 2022-05-06 | End: 2022-05-06

## 2022-05-06 RX ORDER — CLONIDINE HYDROCHLORIDE 0.1 MG/1
0.1 TABLET ORAL
Status: DISCONTINUED | OUTPATIENT
Start: 2022-05-06 | End: 2022-05-07

## 2022-05-06 RX ADMIN — CLONIDINE HYDROCHLORIDE 0.1 MG: 0.1 TABLET ORAL at 05:05

## 2022-05-06 RX ADMIN — CLONIDINE HYDROCHLORIDE 0.1 MG: 0.1 TABLET ORAL at 04:05

## 2022-05-06 NOTE — ED NOTES
"Assuming care of pt. Pt presents via [facility].  C/o syncopal episodes and hypotension.  Onset, intermittently for "months"  Denies fever/ pain.  PMH parkinsons.  Pt was scheduled for holter monitor and bilateral carotid u/s today with dr olaf sparks.  Was unable to attend appt.    "

## 2022-05-06 NOTE — ED PROVIDER NOTES
Encounter Date: 5/6/2022       History     Chief Complaint   Patient presents with    Hypotension     Pt having hypotensive BP readings at home. Syncope with position changes. Orthostatic with EMS. Given 1L NS IVF bolus. EMS also reports pt took home med to help raise BP.      71-year-old white male with a history of anxiety, Parkinson's, dyslipidemia  who was recently hospitalized at this facility last month in April secondary to orthostatic hypotension.  He had come in on April 7th with dizziness and recurrent falls over the past several months.  He has been battling orthostatic hypertension and was taking Northera at 100mg tid but in past week wasincreased to double that dose.  Patient arrives today by ambulance.  His wife is at the bedside.  They both report that this morning he had several syncope and near-syncope episodes with blood pressure in the 80s.  They tried to self treat as instructed with fluid and salt intake.  His symptomatology continued and an ambulance was called.  He was given 1 L of fluid and now the pendulum has swung the other way and he is hypertensive.  Wife states that this happens a lot and he even has a prescription for clonidine but she does not like to give it because he will quickly go the other side as well.    The history is provided by the patient and a significant other.   General Illness   The current episode started today. The problem occurs frequently. Relieved by: Drinking fluids. Exacerbated by: Standing. Pertinent negatives include no fever, no decreased vision, no double vision, no photophobia, no abdominal pain, no diarrhea, no vomiting, no headaches, no sore throat, no muscle aches, no neck pain and no shortness of breath.     Review of patient's allergies indicates:   Allergen Reactions    Penicillins      No past medical history on file.  Past Surgical History:   Procedure Laterality Date    CATARACT EXTRACTION Left     CERVICAL FUSION      INGUINAL HERNIA REPAIR       TONSILLECTOMY      VITRECTOMY       Family History   Problem Relation Age of Onset    Heart disease Father     Heart attack Father     Heart disease Sister     Heart disease Brother      Social History     Tobacco Use    Smoking status: Former Smoker   Substance Use Topics    Alcohol use: Not Currently    Drug use: Never     Review of Systems   Constitutional: Negative for fever.   HENT: Negative.  Negative for sore throat.    Eyes: Negative.  Negative for double vision and photophobia.   Respiratory: Negative.  Negative for shortness of breath.    Cardiovascular: Negative.    Gastrointestinal: Negative.  Negative for abdominal pain, diarrhea and vomiting.   Genitourinary: Negative.    Musculoskeletal: Negative for neck pain.   Neurological: Positive for syncope. Negative for dizziness, tremors, seizures, weakness and headaches.   Psychiatric/Behavioral: Negative.        Physical Exam     Initial Vitals [05/06/22 1340]   BP Pulse Resp Temp SpO2   (!) 201/107 75 16 97.2 °F (36.2 °C) 98 %      MAP       --         Physical Exam    Constitutional: He appears well-developed and well-nourished.   HENT:   Head: Normocephalic and atraumatic.   No evidence of head trauma   Eyes: Conjunctivae and EOM are normal. Pupils are equal, round, and reactive to light.   Neck:   Nontender   Normal range of motion.  Cardiovascular: Normal rate and regular rhythm.   No murmur heard.  Pulmonary/Chest: Breath sounds normal.   Abdominal: Abdomen is soft.   Musculoskeletal:         General: No edema. Normal range of motion.      Cervical back: Normal range of motion.     Neurological: He is alert and oriented to person, place, and time. No cranial nerve deficit. GCS score is 15. GCS eye subscore is 4. GCS verbal subscore is 5. GCS motor subscore is 6.   Skin: Skin is warm. Capillary refill takes less than 2 seconds.   Multiple old bruises noted on his body   Psychiatric: He has a normal mood and affect.         ED Course    Procedures  Labs Reviewed   COMPREHENSIVE METABOLIC PANEL   CBC W/ AUTO DIFFERENTIAL    Narrative:     The following orders were created for panel order CBC Auto Differential.  Procedure                               Abnormality         Status                     ---------                               -----------         ------                     CBC with Differential[[id number]]                                                         Please view results for these tests on the individual orders.   CBC WITH DIFFERENTIAL   POCT TROPONIN     EKG Readings: (Independently Interpreted)   Initial Reading: No STEMI. Rhythm: Normal Sinus Rhythm. Heart Rate: 75. Ectopy: No Ectopy. Conduction: Normal.       Imaging Results    None          Medications - No data to display  Medical Decision Making:   Differential Diagnosis:   Orthostatic hypotension, medication reaction, dysrhythmias, TIA,  Clinical Tests:   Lab Tests: Ordered  Medical Tests: Ordered  ED Management:  Patient and his wife made it clear that they do not feel comfortable going home.  I will order laboratory workup and speak to the doctor on-call for his primary Dr. Case which will be Dr. Covarrubias.                      Clinical Impression:   Final diagnoses:  [R55] Syncope  [I95.1] Orthostatic hypotension (Primary)  [R55] Syncope and collapse          ED Disposition Condition    Admit               Radha Bahena MD  05/06/22 0377       Radha Bahena MD  05/06/22 2136

## 2022-05-06 NOTE — H&P
[facility]    History & Physical      Patient Name: Buzz Fontenot  MRN: [id number]  Admission Date: 5/6/2022  Attending Physician: Primary Care Provider: Sean Moore Ii, MD         Patient information was obtained from     Subjective:     Principal Problem:<principal problem not specified>    Chief Complaint:   Chief Complaint   Patient presents with    Hypotension     Pt having hypotensive BP readings at home. Syncope with position changes. Orthostatic with EMS. Given 1L NS IVF bolus. EMS also reports pt took home med to help raise BP.         HPI:  The patient is a 71-year-old man who suffers from fairly severe Parkinson's disease and associated orthostatic hypotension.  His problems have been getting worse over the past few weeks and has been falling every few hours.  5-6 times per day he collapses.  He has been worked up extensively apparently for this problem.  He see Dr. Becerra for his Parkinson's disease and recently increased his dose of droxydopa from 100 t.i.d. to 200 mg t.i.d. he was scheduled to see Dr. Efraín mendenhall in the office today but had another fall and severe drop in blood pressure.  He was brought to the hospital emergency room and was evaluated and admitted to Dr. Potter service.    Currently the patient feels okay.  He is lying in bed comfortably.  However his blood pressure has been quite high he and eventually has required p.r.n. meds.  He was given a small dose of clonidine 0.1 mg and has thus far has had only a minimal affect.    Current medications reviewed these include proxy dopa which is not listed in the electronic record and April Ballard.    Allergies to penicillin    Past medical history notable for Parkinson's disease, depression, hypertension, hyperlipidemia.    He is status post anterior cervical fusion hernia repair eye surgery and tonsillectomy    He does not smoke or use alcohol    He is retired as a will feel .  He lives at  home with his wife.  He is becoming increasingly inactive and has been able to walk for the past 2 months because of the orthostasis and collapse.  It seems that he rarely passes out but falls frequently    Family history noncontributory there is a strong history of heart disease    Review of systems largely unremarkable.  He has lost about 10 lb over the past 6 weeks or so.  Due to a poor appetite.  No fevers chills or sweats    No unusual headaches no dysphagia.  No chest pain undue shortness of breath orthopnea PND or pedal edema.  No cough wheezing asthma.  No nausea or vomiting.  No change in bowel habits although he is prone to constipation.  He does have a history of BPH and prostatism and sometimes has trouble voiding.  There apparently was a spot of blood in the urine today.  With his diagnosis of Parkinson's disease.    Physical exam reveals elevated blood pressure about 210/105 at the moment.  It was higher earlier.  Heart rate 70 respiratory rate 16    General appearance is unremarkable he is in no acute distress there is also paucity of facial expression throat clear with normal moist mucous membranes    Neck is supple without thyromegaly or adenopathy    Heart has a regular rate and rhythm    Lungs clear    Abdomen nontender without mass or hepatomegaly there is some vague generalized tenderness    Extremities without clubbing cyanosis or edema.  Foot pulses intact    Skin reveals numerous abrasions and bruises throughout heart    No past medical history on file.    Past Surgical History:   Procedure Laterality Date    CATARACT EXTRACTION Left     CERVICAL FUSION      INGUINAL HERNIA REPAIR      TONSILLECTOMY      VITRECTOMY         Review of patient's allergies indicates:   Allergen Reactions    Penicillins Rash       No current facility-administered medications on file prior to encounter.     Current Outpatient Medications on File Prior to Encounter   Medication Sig    buPROPion (WELLBUTRIN  XL) 150 MG TB24 tablet Take 300 mg by mouth every morning.    carbidopa-levodopa (RYTARY) 36. mg CpSR Take 2 capsules by mouth 5 (five) times daily.    clonazePAM (KLONOPIN) 0.5 MG tablet Take 0.5 mg by mouth 2 (two) times daily as needed.    DULoxetine (CYMBALTA) 60 MG capsule Take 90 mg by mouth nightly.    NUPLAZID 34 mg Cap Take 1 capsule by mouth once daily.    pantoprazole (PROTONIX) 40 MG tablet Take 40 mg by mouth once daily.    simvastatin (ZOCOR) 40 MG tablet Take 40 mg by mouth nightly.    traZODone (DESYREL) 100 MG tablet Take 100 mg by mouth nightly.    [DISCONTINUED] cyanocobalamin 500 MCG tablet Take 500 mcg by mouth.    [DISCONTINUED] DULoxetine (CYMBALTA) 30 MG capsule Take 30 mg by mouth every morning.    [DISCONTINUED] promethazine (PHENERGAN) 25 MG tablet Take 25 mg by mouth 3 (three) times daily as needed.    [DISCONTINUED] amLODIPine (NORVASC) 5 MG tablet Take 5 mg by mouth once daily.    [DISCONTINUED] APOKYN 10 mg/mL Crtg Inject into the skin.    [DISCONTINUED] ketoconazole (NIZORAL) 2 % shampoo Apply topically.    [DISCONTINUED] rOPINIRole (REQUIP) 0.25 MG tablet Take 0.25 mg by mouth 3 (three) times daily.     Family History     Problem Relation (Age of Onset)    Heart attack Father    Heart disease Father, Sister, Brother        Tobacco Use    Smoking status: Former Smoker    Smokeless tobacco: Not on file   Substance and Sexual Activity    Alcohol use: Not Currently    Drug use: Never    Sexual activity: Not Currently     Review of Systems  Objective:     Vital Signs (Most Recent):  Temp: 97.2 °F (36.2 °C) (05/06/22 1340)  Pulse: 81 (05/06/22 1502)  Resp: 20 (05/06/22 1502)  BP: (!) 195/117 (05/06/22 1502)  SpO2: 99 % (05/06/22 1502) Vital Signs (24h Range):  Temp:  [97.2 °F (36.2 °C)] 97.2 °F (36.2 °C)  Pulse:  [] 81  Resp:  [12-23] 20  SpO2:  [92 %-100 %] 99 %  BP: (164-205)/(107-119) 195/117     Weight: 72.6 kg (160 lb)  Body mass index is 24.33  kg/m².           Significant Labs:     Significant Imaging:     Assessment/Plan:     Active Diagnoses:    Diagnosis Date Noted POA    Syncope and collapse [R55] 05/06/2022 Unknown    Hypertension [I10] 05/06/2022 Yes    Orthostatic hypotension [I95.1] 05/06/2022 Yes    Parkinson's disease [G20] 05/06/2022 Yes      Problems Resolved During this Admission:     VTE Risk Mitigation (From admission, onward)    None      The plan will be to use p.r.n. clonidineto keep his blood pressure a bit better controlled.  We will hold droxidopa  for now.  If we do have to resume it will go to the 100 t.i.d. dosing.  Will ask his cardiologist to assess and complete the workup that he had planned to do as an outpatient.  Will consider adding physical therapy.  Will also consider re-consulting his neurologist.      Phil Covarrubias MD  Department of Hospital Medicine   [facility]

## 2022-05-07 PROCEDURE — 25000003 PHARM REV CODE 250: Performed by: INTERNAL MEDICINE

## 2022-05-07 PROCEDURE — 99233 PR SUBSEQUENT HOSPITAL CARE,LEVL III: ICD-10-PCS | Mod: ,,, | Performed by: INTERNAL MEDICINE

## 2022-05-07 PROCEDURE — 94761 N-INVAS EAR/PLS OXIMETRY MLT: CPT

## 2022-05-07 PROCEDURE — 99233 SBSQ HOSP IP/OBS HIGH 50: CPT | Mod: ,,, | Performed by: INTERNAL MEDICINE

## 2022-05-07 PROCEDURE — 25000242 PHARM REV CODE 250 ALT 637 W/ HCPCS: Performed by: INTERNAL MEDICINE

## 2022-05-07 PROCEDURE — 11000001 HC ACUTE MED/SURG PRIVATE ROOM

## 2022-05-07 RX ORDER — ROPINIROLE 0.25 MG/1
0.25 TABLET, FILM COATED ORAL 3 TIMES DAILY
Status: DISCONTINUED | OUTPATIENT
Start: 2022-05-07 | End: 2022-05-10 | Stop reason: HOSPADM

## 2022-05-07 RX ORDER — BUPROPION HYDROCHLORIDE 150 MG/1
300 TABLET ORAL EVERY MORNING
Status: DISCONTINUED | OUTPATIENT
Start: 2022-05-07 | End: 2022-05-10

## 2022-05-07 RX ORDER — PRAVASTATIN SODIUM 10 MG/1
20 TABLET ORAL DAILY
Status: DISCONTINUED | OUTPATIENT
Start: 2022-05-07 | End: 2022-05-10 | Stop reason: HOSPADM

## 2022-05-07 RX ORDER — TRAZODONE HYDROCHLORIDE 100 MG/1
100 TABLET ORAL NIGHTLY
Status: DISCONTINUED | OUTPATIENT
Start: 2022-05-07 | End: 2022-05-09

## 2022-05-07 RX ORDER — DULOXETIN HYDROCHLORIDE 30 MG/1
90 CAPSULE, DELAYED RELEASE ORAL NIGHTLY
Status: DISCONTINUED | OUTPATIENT
Start: 2022-05-07 | End: 2022-05-10

## 2022-05-07 RX ORDER — ONDANSETRON 4 MG/1
8 TABLET, ORALLY DISINTEGRATING ORAL EVERY 6 HOURS PRN
Status: DISCONTINUED | OUTPATIENT
Start: 2022-05-07 | End: 2022-05-10 | Stop reason: HOSPADM

## 2022-05-07 RX ORDER — CLONIDINE HYDROCHLORIDE 0.1 MG/1
0.1 TABLET ORAL EVERY 4 HOURS PRN
Status: DISCONTINUED | OUTPATIENT
Start: 2022-05-07 | End: 2022-05-10 | Stop reason: HOSPADM

## 2022-05-07 RX ORDER — PANTOPRAZOLE SODIUM 40 MG/1
40 TABLET, DELAYED RELEASE ORAL DAILY
Status: DISCONTINUED | OUTPATIENT
Start: 2022-05-07 | End: 2022-05-10 | Stop reason: HOSPADM

## 2022-05-07 RX ORDER — CLONAZEPAM 0.5 MG/1
0.5 TABLET ORAL 2 TIMES DAILY PRN
Status: DISCONTINUED | OUTPATIENT
Start: 2022-05-07 | End: 2022-05-10 | Stop reason: HOSPADM

## 2022-05-07 RX ADMIN — PRAVASTATIN SODIUM 20 MG: 10 TABLET ORAL at 02:05

## 2022-05-07 RX ADMIN — TRAZODONE HYDROCHLORIDE 100 MG: 100 TABLET ORAL at 08:05

## 2022-05-07 RX ADMIN — DULOXETINE 90 MG: 30 CAPSULE, DELAYED RELEASE ORAL at 08:05

## 2022-05-07 RX ADMIN — PANTOPRAZOLE SODIUM 40 MG: 40 TABLET, DELAYED RELEASE ORAL at 02:05

## 2022-05-07 RX ADMIN — CLONIDINE HYDROCHLORIDE 0.1 MG: 0.1 TABLET ORAL at 09:05

## 2022-05-07 RX ADMIN — PIMAVANSERIN TARTRATE 34 MG: 34 CAPSULE ORAL at 04:05

## 2022-05-07 RX ADMIN — ONDANSETRON 8 MG: 4 TABLET, ORALLY DISINTEGRATING ORAL at 01:05

## 2022-05-07 RX ADMIN — ROPINIROLE HYDROCHLORIDE 0.25 MG: 0.25 TABLET, FILM COATED ORAL at 02:05

## 2022-05-07 RX ADMIN — ROPINIROLE HYDROCHLORIDE 0.25 MG: 0.25 TABLET, FILM COATED ORAL at 08:05

## 2022-05-07 RX ADMIN — BUPROPION HYDROCHLORIDE 300 MG: 150 TABLET, FILM COATED, EXTENDED RELEASE ORAL at 03:05

## 2022-05-07 RX ADMIN — CLONIDINE HYDROCHLORIDE 0.1 MG: 0.1 TABLET ORAL at 03:05

## 2022-05-07 NOTE — PLAN OF CARE
Problem: Adult Inpatient Plan of Care  Goal: Plan of Care Review  5/7/2022 0200 by Amaya Davis RN  Outcome: Ongoing, Progressing  5/7/2022 0156 by Amaya Davis RN  Outcome: Ongoing, Progressing  Flowsheets (Taken 5/7/2022 0156)  Plan of Care Reviewed With:   patient   spouse  Goal: Patient-Specific Goal (Individualized)  5/7/2022 0200 by Amaya Davis RN  Outcome: Ongoing, Progressing  5/7/2022 0156 by Amaya Davis RN  Outcome: Ongoing, Progressing  Flowsheets (Taken 5/7/2022 0156)  Anxieties, Fears or Concerns: (Fluctuations of blood pressure.) --  Goal: Absence of Hospital-Acquired Illness or Injury  5/7/2022 0200 by Amaya Davis RN  Outcome: Ongoing, Progressing  5/7/2022 0156 by Amaya Davis RN  Outcome: Ongoing, Progressing  Intervention: Identify and Manage Fall Risk  Flowsheets (Taken 5/7/2022 0156)  Safety Promotion/Fall Prevention:   assistive device/personal item within reach   Fall Risk reviewed with patient/family   Fall Risk signage in place   medications reviewed   nonskid shoes/socks when out of bed   high risk medications identified  Intervention: Prevent Skin Injury  Flowsheets (Taken 5/7/2022 0156)  Body Position:   position changed independently   neutral head position   neutral body alignment   30 degrees  Intervention: Prevent and Manage VTE (Venous Thromboembolism) Risk  Flowsheets (Taken 5/7/2022 0156)  Activity Management: Sitting at edge of bed - L2  VTE Prevention/Management: dorsiflexion/plantar flexion performed  Goal: Optimal Comfort and Wellbeing  5/7/2022 0200 by Amaya Davis RN  Outcome: Ongoing, Progressing  5/7/2022 0156 by Amaya Davis RN  Outcome: Ongoing, Progressing  Intervention: Monitor Pain and Promote Comfort  Flowsheets (Taken 5/7/2022 0156)  Pain Management Interventions:   pain management plan reviewed with patient/caregiver   pillow support provided   position adjusted  Goal: Readiness for Transition of Care  5/7/2022 0200 by Amaya  [place], RN  Outcome: Ongoing, Progressing  5/7/2022 0156 by Amaya Davis RN  Outcome: Ongoing, Progressing  Intervention: Mutually Develop Transition Plan  Flowsheets (Taken 5/7/2022 0156)  Transportation Anticipated: family or friend will provide

## 2022-05-07 NOTE — CONSULTS
Inpatient consult to Cardiology  Consult performed by: JARROD Rhodes  Consult ordered by: JARROD Rhodes  Reason for consult: hypotension, syncope        [facility]  Cardiology  Consult Note    Patient Name: Buzz Fontenot  MRN: [id number]  Admission Date: 5/6/2022  Hospital Length of Stay: 1 days  Code Status: No Order   Attending Provider: Sean Moore II, MD   Consulting Provider: JARROD Rhodes  Primary Care Physician: Sean Moore Ii, MD  Principal Problem:<principal problem not specified>    Patient information was obtained from patient, spouse/SO, past medical records and ER records.     Subjective:     Chief Complaint:  consulted for orthostatic hypotension    HPI:  This is a 71-year-old male, who is known to Dr. 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  He presented to the ER on 05/06/2022 following several falls secondary to orthostatic hypotension.  Patient has been battling this for some time however it has worsened over the past few weeks and has been falling every few hours.  He reported collapsing 5-6 times per day.  [facility] has been consulted to assist with workup of severe orthostatic hypotension.      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  PSH:  Tonsillectomy, hernia repair, neck surgery, LHC  Social History:  Former smoker quit in 2006, denies EtOH and illicit drug use  Family History:  Father-MI    Previous Cardiac Diagnostics:   Echo 4.9.22  LV Normal thickness and volume.  LV systolic function is normal .  Visually estimated EF% is 60-65% . No regional wall motion abnormality.  Indeterminate diastolic function.  Normal IVC with normal inspiratory collapse.  Possible liver cyst. Consider additional imaging.    PET 11.18.20  This is a normal perfusion study, no perfusion defects noted. There is no evidence of  ischemia.   This scan is suggestive of low risk for future cardiovascular events.   The left ventricular cavity is noted to be normal on the stress studies. The stress left ventricular ejection fraction was calculated to be 66% and left ventricular global function is normal. The rest left ventricular cavity is noted to be normal. The rest left ventricular ejection fraction was calculated to be 58% and rest left ventricular global function is normal.   When compared to the resting ejection fraction (58%), the stress ejection fraction (66%) has increased.   The study quality is average.     Echo 11.17.20  The study quality is average.   The left ventricle is normal in size. Global left ventricular systolic function is borderline normal. The left ventricular ejection fraction is 50%. Asymmetric septal left ventricular hypertrophy is present. It is mild to moderate.   Mild calcification of the aortic valve is noted with adequate cuspal excursion.   Mild (1+) pulmonic regurgitation. Mild (1+) mitral regurgitation.  Insufficient TR to calculate RVSP.        Review of patient's allergies indicates:   Allergen Reactions    Penicillins Rash       No current facility-administered medications on file prior to encounter.     Current Outpatient Medications on File Prior to Encounter   Medication Sig    buPROPion (WELLBUTRIN XL) 150 MG TB24 tablet Take 300 mg by mouth every morning.    carbidopa-levodopa (RYTARY) 36. mg CpSR Take 2 capsules by mouth 5 (five) times daily.    clonazePAM (KLONOPIN) 0.5 MG tablet Take 0.5 mg by mouth 2 (two) times daily as needed.    DULoxetine (CYMBALTA) 60 MG capsule Take 90 mg by mouth nightly.    NUPLAZID 34 mg Cap Take 1 capsule by mouth once daily.    pantoprazole (PROTONIX) 40 MG tablet Take 40 mg by mouth once daily.    simvastatin (ZOCOR) 40 MG tablet Take 40 mg by mouth nightly.    traZODone (DESYREL) 100 MG tablet Take 100 mg by mouth nightly.     Family History     Problem  Relation (Age of Onset)    Heart attack Father    Heart disease Father, Sister, Brother        Tobacco Use    Smoking status: Former Smoker    Smokeless tobacco: Not on file   Substance and Sexual Activity    Alcohol use: Not Currently    Drug use: Never    Sexual activity: Not Currently     Review of Systems:  Review of Systems   Constitutional: Negative.    HENT: Negative.    Eyes: Negative.    Respiratory: Negative.    Cardiovascular: Negative.    Gastrointestinal: Negative.    Endocrine: Negative.    Genitourinary: Negative.    Musculoskeletal: Negative.    Skin: Negative.    Allergic/Immunologic: Negative.    Neurological: Positive for syncope.   Hematological: Negative.    Psychiatric/Behavioral: Negative.        Objective:     Vital Signs (Most Recent):  Temp: 98 °F (36.7 °C) (05/07/22 0439)  Pulse: 75 (05/07/22 0439)  Resp: 17 (05/06/22 1542)  BP: (!) 162/89 (05/07/22 0439)  SpO2: 96 % (05/07/22 0439) Vital Signs (24h Range):  Temp:  [97.2 °F (36.2 °C)-98 °F (36.7 °C)] 98 °F (36.7 °C)  Pulse:  [] 75  Resp:  [12-23] 17  SpO2:  [92 %-100 %] 96 %  BP: (132-219)/() 162/89     Weight: 72.6 kg (160 lb)  Body mass index is 24.33 kg/m².    SpO2: 96 %  O2 Device (Oxygen Therapy): room air      Intake/Output Summary (Last 24 hours) at 5/7/2022 0801  Last data filed at 5/7/2022 0600  Gross per 24 hour   Intake 180 ml   Output 1900 ml   Net -1720 ml       Lines/Drains/Airways     Peripheral Intravenous Line  Duration                Peripheral IV - Single Lumen 05/06/22 1400 <1 day                  Significant Labs:  Recent Lab Results       05/06/22  1439        Abs Lymph 1.66       Abs Neutro 8.9       Albumin 3.7       Albumin/Globulin Ratio 1.4       Alkaline Phosphatase 183       ALT <5       AST 9       Baso # 0.07       Basophil % 0.6       Bilirubin, Direct 0.3       Bilirubin, Indirect 0.40       BILIRUBIN TOTAL 0.7       BUN 10.6       Calcium 8.8       Chloride 106       CO2 26        Creatinine 0.85       eGFR if non African American >60       Eos # 0.30       Eosinophil % 2.5       Globulin, Total 2.6       Glucose 77       Hematocrit 39.5       Hemoglobin 12.4       Immature Grans (Abs) 0.10       Immature Granulocytes 0.8       Lymph Auto 13.9       MCH 29.5       MCHC 31.4       MCV 93.8       Mono # 0.89       Mono % 7.5       MPV 10.4       Neutrophils Relative 74.7       nRBC 0.0       Platelets 252       Potassium 4.5       PROTEIN TOTAL 6.3       RBC 4.21       RDW 13.6       Sodium 139       WBC 11.9               Significant Imaging:  Imaging Results    None         Last Lipids:  Lab Results   Component Value Date    CHOL 151 10/01/2021    CHOL 141 09/23/2020    CHOL 187 12/16/2019     Lab Results   Component Value Date    HDL 40 10/01/2021    HDL 33 (L) 09/23/2020    HDL 38 12/16/2019     No results found for: LDLCALC  Lab Results   Component Value Date    TRIG 97 10/01/2021    TRIG 119 09/23/2020    TRIG 137 12/16/2019     No results found for: CHOLHDL    EKG:  No results found for this visit on 05/06/22.    Telemetry:  SR    Physical Exam:  Physical Exam  Constitutional:       Appearance: Normal appearance.   HENT:      Head: Normocephalic.   Eyes:      Extraocular Movements: Extraocular movements intact.      Conjunctiva/sclera: Conjunctivae normal.   Cardiovascular:      Rate and Rhythm: Normal rate and regular rhythm.      Pulses: Normal pulses.      Heart sounds: Normal heart sounds.   Pulmonary:      Effort: Pulmonary effort is normal.      Breath sounds: Normal breath sounds.   Abdominal:      Palpations: Abdomen is soft.   Musculoskeletal:         General: Normal range of motion.      Cervical back: Neck supple.   Skin:     General: Skin is warm and dry.   Neurological:      Mental Status: He is alert and oriented to person, place, and time. Mental status is at baseline.   Psychiatric:         Mood and Affect: Mood normal.         Behavior: Behavior normal.         Home  Medications:   No current facility-administered medications on file prior to encounter.     Current Outpatient Medications on File Prior to Encounter   Medication Sig Dispense Refill    buPROPion (WELLBUTRIN XL) 150 MG TB24 tablet Take 300 mg by mouth every morning.      carbidopa-levodopa (RYTARY) 36. mg CpSR Take 2 capsules by mouth 5 (five) times daily.      clonazePAM (KLONOPIN) 0.5 MG tablet Take 0.5 mg by mouth 2 (two) times daily as needed.      DULoxetine (CYMBALTA) 60 MG capsule Take 90 mg by mouth nightly.      NUPLAZID 34 mg Cap Take 1 capsule by mouth once daily.      pantoprazole (PROTONIX) 40 MG tablet Take 40 mg by mouth once daily.      simvastatin (ZOCOR) 40 MG tablet Take 40 mg by mouth nightly.      traZODone (DESYREL) 100 MG tablet Take 100 mg by mouth nightly.         Current Inpatient Medications:    Current Facility-Administered Medications:     cloNIDine tablet 0.1 mg, 0.1 mg, Oral, Q1H PRN, Phil Covarrubias MD, 0.1 mg at 05/06/22 1744         VTE Risk Mitigation (From admission, onward)         Ordered     Place sequential compression device  Until discontinued         05/06/22 1738                Assessment:     IMPRESSION:  Syncope secondary to orthostatic hypotension  Severe orthostatic hypotension secondary to autonomic dysfunction  CAD  HTN  HLD  Bilateral NEIL  VHD/mild MR  Parkinson's disease      PLAN:     Plan:  Carotid US  Plan for 2 week MCT monitor as outpatient  Restart outpatient Northera 100mg TID. If to much for patient, consider 50mg TID. Will need Clonidine 0.1mg TID PRN also. Change parameter to >180/110. Will be difficult to strictly control BP - would recommend range >100mmg and < 160mmHg to prevent further syncope. Other titration of medications can be done as an outpatient.   Adjustment of Parkinson's medications per primary/neuro  Encourage PO intake/staying well hydrated. Educated on compression stockings  F/U with Dr. Huynh upon completion of MCT  monitor.    Thank you for your consult.     [facility]-BC  Cardiology  [facility]  05/07/2022 8:01 AM

## 2022-05-07 NOTE — PROGRESS NOTES
[facility]  Adult Nutrition  Progress Note    SUMMARY       Recommendations    Recommendation/Intervention:   1. Continue current diet as tolerated.   2. Add orange Boost Breeze TID. Provides 240kcal, 9gm protein/serving.   3. Consider appetite stimulant per MD.    Goals: At least 75% po intake of est needs  Nutrition Goal Status: new    Malnutrition Assessment  Malnutrition Type: acute illness or injury  Energy Intake: moderate energy intake  Weight Loss (Malnutrition): greater than 5% in 1 month  Energy Intake (Malnutrition): less than or equal to 75% for greater than or equal to 1 month  Subcutaneous Fat (Malnutrition): mild depletion  Muscle Mass (Malnutrition): mild depletion   Orbital Region (Subcutaneous Fat Loss): mild depletion  Upper Arm Region (Subcutaneous Fat Loss): mild depletion   Yarsanism Region (Muscle Loss): mild depletion  Clavicle Bone Region (Muscle Loss): mild depletion    Subcutaneous Fat Loss (Final Summary): mild protein-calorie malnutrition  Muscle Loss Evaluation (Final Summary): mild protein-calorie malnutrition    Severe Weight Loss (Malnutrition): greater than 5% in 1 month    Reason for Assessment    Reason For Assessment: identified at risk by screening criteria (MST (unsure wt loss))  Diagnosis:  ( Syncope and collapse  Hypertension   Orthostatic hypotension  Parkinson's disease)  Relevant Medical History: Parkinsons    General Information Comments: Spoke with pt and spouse at bedside. Pt confirmed ~10# wt loss over past month per MD notes. Wife stated had intentionally lost some wt ~1 year ago, but recently had poor appetite and had lost wt unintentionally. Offered clear ONS since pt drinking pedialyte at home. Agreeable to orange flavor.    Nutrition Risk Screen    Nutrition Risk Screen: no indicators present    Nutrition/Diet History    Factors Affecting Nutritional Intake: decreased appetite    Anthropometrics    Temp: 98.2 °F (36.8 °C)  Height:  "5 7." (172.7 cm)  Height (inches): 67.99 in  Weight: 72.6 kg (160 lb)  Weight (lb): 160 lb  Ideal Body Weight (IBW), Male: 153.94 lb  % Ideal Body Weight, Male (lb): 103.94 %  BMI (Calculated): 24.3  BMI Grade: 18.5-24.9 - normal  Weight Loss: unintentional  Usual Body Weight (UBW), k.27 kg  Weight Change Amount: 10 lb  % Usual Body Weight: 94.12  % Weight Change From Usual Weight: -6.08 %       Lab/Procedures/Meds    Pertinent Labs Reviewed: reviewed  Pertinent Labs Comments: 22 Glu   Pertinent Medications Reviewed: reviewed    Estimated/Assessed Needs    Weight Used For Calorie Calculations: 72.6 kg (160 lb 0.9 oz)  Energy Calorie Requirements (kcal): 2050kcal (1.4 stress factor)  Energy Need Method: Jerome-St Jeor  Protein Requirements: 95gm  Weight Used For Protein Calculations: 72.6 kg (160 lb 0.9 oz)  Fluid Requirements (mL): 0ml  Estimated Fluid Requirement Method: RDA Method  RDA Method (mL): 0    Nutrition Prescription Ordered    Current Diet Order: Regular    Evaluation of Received Nutrient/Fluid Intake    Energy Calories Required: not meeting needs  Protein Required: not meeting needs  Tolerance: tolerating  % Intake of Estimated Energy Needs: 0 - 25 %  % Meal Intake: 0 - 25 %    Nutrition Risk    Level of Risk/Frequency of Follow-up: moderate     Monitor and Evaluation    Food and Nutrient Intake: energy intake     Nutrition Follow-Up    RD Follow-up?: Yes    "

## 2022-05-07 NOTE — PROGRESS NOTES
Subjective the patient has no complaints.  He did have some nausea earlier.  His blood pressure remains labile but he has not had any low readings.  He really has not been up much however.    Objective he is afebrile blood pressure most recently 144/81 heart rate 70 respiratory rate 18 O2 sat 96%    General.  He is in no distress he is alert and appropriate.  Heart has a regular rate and rhythm.  Lungs clear.  Abdomen nontender.  Extremities without clubbing cyanosis or edema.    Recommendations per Dr. Mathews reviewed.    Assessment    1. Orthostatic hypotension.  So far his pressures been either normal or high during this hospital stay.    2. History of autonomic dysfunction.    3. Parkinson's disease    4. Hypertension.    Will resume most of his home meds but will continue to hold the droxy dopa.  Will add physical therapy.  He will be monitored closely.

## 2022-05-08 PROCEDURE — 25000242 PHARM REV CODE 250 ALT 637 W/ HCPCS: Performed by: INTERNAL MEDICINE

## 2022-05-08 PROCEDURE — 94761 N-INVAS EAR/PLS OXIMETRY MLT: CPT

## 2022-05-08 PROCEDURE — 25000003 PHARM REV CODE 250: Performed by: INTERNAL MEDICINE

## 2022-05-08 PROCEDURE — 99232 SBSQ HOSP IP/OBS MODERATE 35: CPT | Mod: ,,, | Performed by: INTERNAL MEDICINE

## 2022-05-08 PROCEDURE — 11000001 HC ACUTE MED/SURG PRIVATE ROOM

## 2022-05-08 PROCEDURE — 99232 PR SUBSEQUENT HOSPITAL CARE,LEVL II: ICD-10-PCS | Mod: ,,, | Performed by: INTERNAL MEDICINE

## 2022-05-08 PROCEDURE — 97162 PT EVAL MOD COMPLEX 30 MIN: CPT

## 2022-05-08 RX ORDER — CLONIDINE HYDROCHLORIDE 0.1 MG/1
0.1 TABLET ORAL ONCE
Status: COMPLETED | OUTPATIENT
Start: 2022-05-08 | End: 2022-05-08

## 2022-05-08 RX ADMIN — CLONIDINE HYDROCHLORIDE 0.1 MG: 0.1 TABLET ORAL at 05:05

## 2022-05-08 RX ADMIN — ROPINIROLE HYDROCHLORIDE 0.25 MG: 0.25 TABLET, FILM COATED ORAL at 08:05

## 2022-05-08 RX ADMIN — PRAVASTATIN SODIUM 20 MG: 10 TABLET ORAL at 08:05

## 2022-05-08 RX ADMIN — CLONIDINE HYDROCHLORIDE 0.1 MG: 0.1 TABLET ORAL at 11:05

## 2022-05-08 RX ADMIN — ROPINIROLE HYDROCHLORIDE 0.25 MG: 0.25 TABLET, FILM COATED ORAL at 02:05

## 2022-05-08 RX ADMIN — PANTOPRAZOLE SODIUM 40 MG: 40 TABLET, DELAYED RELEASE ORAL at 08:05

## 2022-05-08 RX ADMIN — CLONAZEPAM 0.5 MG: 0.5 TABLET ORAL at 11:05

## 2022-05-08 RX ADMIN — CLONIDINE HYDROCHLORIDE 0.1 MG: 0.1 TABLET ORAL at 04:05

## 2022-05-08 RX ADMIN — CLONIDINE HYDROCHLORIDE 0.1 MG: 0.1 TABLET ORAL at 03:05

## 2022-05-08 RX ADMIN — TRAZODONE HYDROCHLORIDE 100 MG: 100 TABLET ORAL at 08:05

## 2022-05-08 RX ADMIN — ONDANSETRON 8 MG: 4 TABLET, ORALLY DISINTEGRATING ORAL at 11:05

## 2022-05-08 RX ADMIN — DULOXETINE 90 MG: 30 CAPSULE, DELAYED RELEASE ORAL at 08:05

## 2022-05-08 RX ADMIN — PIMAVANSERIN TARTRATE 34 MG: 34 CAPSULE ORAL at 08:05

## 2022-05-08 RX ADMIN — BUPROPION HYDROCHLORIDE 300 MG: 150 TABLET, FILM COATED, EXTENDED RELEASE ORAL at 08:05

## 2022-05-08 NOTE — PT/OT/SLP EVAL
Physical Therapy Evaluation    Patient Name:  Buzz Fontenot   MRN:  [id number]    Recommendations:     Discharge Recommendations:  rehabilitation facility   Discharge Equipment Recommendations: walker, rolling   Barriers to discharge: frequent falls    Assessment:     Buzz Fontenot is a 71 y.o. male admitted with a medical diagnosis of Syncope and collapse with Parkinson's disease.  He presents with the following impairments/functional limitations:  weakness, gait instability, impaired endurance, impaired balance, decreased safety awareness, impaired self care skills, impaired functional mobilty.    Rehab Prognosis: Fair; patient would benefit from acute skilled PT services to address these deficits and reach maximum level of function.      Plan:     During this hospitalization, patient to be seen 5 x/week to address the identified rehab impairments via gait training, therapeutic activities, therapeutic exercises, neuromuscular re-education and progress toward the following goals:    · Plan of Care Expires:  05/29/22     Subjective     Chief Complaint: weakness  Patient/Family Comments/goals: get stronger, walk  Pain/Comfort:  · Pain Rating 1: 0/10    Patients cultural, spiritual, [employer] conflicts given the current situation: no    Living Environment:  Home in a single story apartment with wife and home health 2x/week.  Prior to admission, patients level of function was assistance with bathing, dressing, and ADLs.  Equipment used at home: none.  DME owned (not currently used): none.  Upon discharge, patient will have assistance from wife and home health therapy.    Objective:     Communicated with RN prior to session.  Patient found supine upon PT entry to room.    General Precautions: Standard, fall   Orthopedic Precautions:N/A   Respiratory Status: Room air    Exams:   RLE ROM: WFL except increase in tone   LLE ROM: WFL except increase in tone   RLE strength: 4/5   LLE strength: 4/5  Orthostatics:   Supine:  189/110   Sittin/92   Standin/61    Positive and symptomatic orthostatics.   Sat patient edge of bed for 5 minutes and performed LAQ and ankle pumps with improved BP to 136/83 mmHg.    Functional Mobility:  Pt required mod A for supine<>sit and sit<>stand transfers with significant posterior leaning and blocking required at knees to prevent sliding once sitting EOB.  Pt able to perform standing marches x30 seconds following sitting EOB performing ankle pumps once BP improved.  After ~ 30 sec standing marches, pt with increased posterior lean and falling back. Assisted pt to sitting EOB with mod A.   Continued with ankle pumps with improvement in symptoms. Then returned pt back to supine with mod A.     Recommend abdominal binder and compression socks with any further mobility.     AM-PAC 6 CLICK MOBILITY  Total Score:11     Patient left supine with call button in reach, RN notified and wife present.    GOALS:   Multidisciplinary Problems     Physical Therapy Goals        Problem: Physical Therapy    Goal Priority Disciplines Outcome Goal Variances Interventions   Physical Therapy Goal     PT, PT/OT Ongoing, Progressing     Description: Goals to be met by: 22     Patient will increase functional independence with mobility by performin. Supine to sit with stand by assist  2. Sit to supine with stand by assist  3. Sit to stand transfer with Stand-by Assistance  4. Gait  x 150 feet with Minimal Assistance using Rolling Walker.                      History:     No past medical history on file.    Past Surgical History:   Procedure Laterality Date    CATARACT EXTRACTION Left     CERVICAL FUSION      INGUINAL HERNIA REPAIR      TONSILLECTOMY      VITRECTOMY         Time Tracking:     PT Received On: 22  PT Start Time: 1342     PT Stop Time: 1420  PT Total Time (min): 38 min     Billable Minutes: Evaluation Mod complexity 38 min      2022

## 2022-05-08 NOTE — PROGRESS NOTES
[facility] Hospital Medicine  Progress Note    Patient Name: Buzz Fontenot  MRN: [id number]  Patient Class: IP- Inpatient   Admission Date: 5/6/2022  Length of Stay: 2 days  Attending Physician: Sean Moore II, MD  Primary Care Provider: Sean Moore Ii, MD        Subjective:     Principal Problem:Syncope and collapse    Interval History:  The patient has no complaints.  He did try to stand up to urinate with the urinal yesterday and felt very weak.  Physical therapy is not yet seen him..  He overall he feels well and has no new complaints.    Review of Systems  Objective:     Vital Signs (Most Recent):  Temp: 97.5 °F (36.4 °C) (05/08/22 1100)  Pulse: 87 (05/08/22 1100)  Resp: 18 (05/08/22 1100)  BP: 123/77 (05/08/22 1100)  SpO2: 97 % (05/08/22 1100) Vital Signs (24h Range):  Temp:  [97.5 °F (36.4 °C)-98.6 °F (37 °C)] 97.5 °F (36.4 °C)  Pulse:  [73-87] 87  Resp:  [18-20] 18  SpO2:  [95 %-98 %] 97 %  BP: (123-192)/() 123/77     Weight: 72.6 kg (160 lb)  Body mass index is 24.33 kg/m².    Intake/Output Summary (Last 24 hours) at 5/8/2022 1236  Last data filed at 5/8/2022 0700  Gross per 24 hour   Intake 660 ml   Output 675 ml   Net -15 ml      Physical Exam general appearance is unremarkable.  Vital signs are stable.  Heart has a regular rate and rhythm.  Lungs clear.  Abdomen nontender.  Extremities without clubbing cyanosis or edema.    I did review some telemetry strips and they looked okay.      Overview/Hospital Course:  Overall the patient is stable.  Unfortunately we have not been able to start moving him much.  His blood pressure is mildly elevated but certainly acceptable given the problems with severe orthostasis.  Hopefully physical therapy can start to work with them later today.  I plan on updating his blood work in the morning.    Significant Labs:     Significant Imaging:     Assessment/Plan:      Active Diagnoses:    Diagnosis Date Noted POA     PRINCIPAL PROBLEM:  Syncope and collapse [R55] 05/06/2022 Yes    Hypertension [I10] 05/06/2022 Yes    Orthostatic hypotension [I95.1] 05/06/2022 Yes    Parkinson's disease [G20] 05/06/2022 Yes      Problems Resolved During this Admission:     VTE Risk Mitigation (From admission, onward)         Ordered     Place sequential compression device  Until discontinued         05/06/22 5242                   Phil Covarrubias MD  Department of Hospital Medicine   [facility]

## 2022-05-08 NOTE — PLAN OF CARE
Problem: Physical Therapy  Goal: Physical Therapy Goal  Description: Goals to be met by: 22     Patient will increase functional independence with mobility by performin. Supine to sit with stand by assist  2. Sit to supine with stand by assist  3. Sit to stand transfer with Stand-by Assistance  4. Gait  x 150 feet with Minimal Assistance using Rolling Walker.     Outcome: Ongoing, Progressing

## 2022-05-09 LAB
ANION GAP SERPL CALC-SCNC: 8 MEQ/L
BASOPHILS # BLD AUTO: 0.05 X10(3)/MCL (ref 0–0.2)
BASOPHILS NFR BLD AUTO: 0.4 %
BUN SERPL-MCNC: 11.4 MG/DL (ref 8.4–25.7)
CALCIUM SERPL-MCNC: 9 MG/DL (ref 8.8–10)
CHLORIDE SERPL-SCNC: 102 MMOL/L (ref 98–107)
CO2 SERPL-SCNC: 26 MMOL/L (ref 23–31)
CREAT SERPL-MCNC: 0.95 MG/DL (ref 0.73–1.18)
CREAT/UREA NIT SERPL: 12
EOSINOPHIL # BLD AUTO: 0.12 X10(3)/MCL (ref 0–0.9)
EOSINOPHIL NFR BLD AUTO: 1.1 %
ERYTHROCYTE [DISTWIDTH] IN BLOOD BY AUTOMATED COUNT: 13.6 % (ref 11.5–17)
GLUCOSE SERPL-MCNC: 126 MG/DL (ref 82–115)
HCT VFR BLD AUTO: 38 % (ref 42–52)
HGB BLD-MCNC: 12.3 GM/DL (ref 14–18)
IMM GRANULOCYTES # BLD AUTO: 0.1 X10(3)/MCL (ref 0–0.02)
IMM GRANULOCYTES NFR BLD AUTO: 0.9 % (ref 0–0.43)
LYMPHOCYTES # BLD AUTO: 1.33 X10(3)/MCL (ref 0.6–4.6)
LYMPHOCYTES NFR BLD AUTO: 11.9 %
MCH RBC QN AUTO: 29.3 PG (ref 27–31)
MCHC RBC AUTO-ENTMCNC: 32.4 MG/DL (ref 33–36)
MCV RBC AUTO: 90.5 FL (ref 80–94)
MONOCYTES # BLD AUTO: 0.64 X10(3)/MCL (ref 0.1–1.3)
MONOCYTES NFR BLD AUTO: 5.7 %
NEUTROPHILS # BLD AUTO: 8.9 X10(3)/MCL (ref 2.1–9.2)
NEUTROPHILS NFR BLD AUTO: 80 %
NRBC BLD AUTO-RTO: 0 %
PLATELET # BLD AUTO: 255 X10(3)/MCL (ref 130–400)
PMV BLD AUTO: 10.1 FL (ref 9.4–12.4)
POTASSIUM SERPL-SCNC: 4.2 MMOL/L (ref 3.5–5.1)
RBC # BLD AUTO: 4.2 X10(6)/MCL (ref 4.7–6.1)
SODIUM SERPL-SCNC: 136 MMOL/L (ref 136–145)
WBC # SPEC AUTO: 11.1 X10(3)/MCL (ref 4.5–11.5)

## 2022-05-09 PROCEDURE — 85025 COMPLETE CBC W/AUTO DIFF WBC: CPT | Performed by: INTERNAL MEDICINE

## 2022-05-09 PROCEDURE — 11000001 HC ACUTE MED/SURG PRIVATE ROOM

## 2022-05-09 PROCEDURE — 99232 PR SUBSEQUENT HOSPITAL CARE,LEVL II: ICD-10-PCS | Mod: ,,, | Performed by: INTERNAL MEDICINE

## 2022-05-09 PROCEDURE — 36415 COLL VENOUS BLD VENIPUNCTURE: CPT | Performed by: INTERNAL MEDICINE

## 2022-05-09 PROCEDURE — 25000242 PHARM REV CODE 250 ALT 637 W/ HCPCS: Performed by: INTERNAL MEDICINE

## 2022-05-09 PROCEDURE — 25000003 PHARM REV CODE 250: Performed by: INTERNAL MEDICINE

## 2022-05-09 PROCEDURE — 99232 SBSQ HOSP IP/OBS MODERATE 35: CPT | Mod: ,,, | Performed by: INTERNAL MEDICINE

## 2022-05-09 PROCEDURE — 97530 THERAPEUTIC ACTIVITIES: CPT

## 2022-05-09 PROCEDURE — 80048 BASIC METABOLIC PNL TOTAL CA: CPT | Performed by: INTERNAL MEDICINE

## 2022-05-09 RX ORDER — CLONIDINE HYDROCHLORIDE 0.1 MG/1
0.1 TABLET ORAL EVERY 4 HOURS PRN
Qty: 30 TABLET | Refills: 3 | OUTPATIENT
Start: 2022-05-09 | End: 2022-08-12

## 2022-05-09 RX ADMIN — PANTOPRAZOLE SODIUM 40 MG: 40 TABLET, DELAYED RELEASE ORAL at 08:05

## 2022-05-09 RX ADMIN — ONDANSETRON 8 MG: 4 TABLET, ORALLY DISINTEGRATING ORAL at 02:05

## 2022-05-09 RX ADMIN — CLONAZEPAM 0.5 MG: 0.5 TABLET ORAL at 08:05

## 2022-05-09 RX ADMIN — BUPROPION HYDROCHLORIDE 300 MG: 150 TABLET, FILM COATED, EXTENDED RELEASE ORAL at 07:05

## 2022-05-09 RX ADMIN — DULOXETINE 90 MG: 30 CAPSULE, DELAYED RELEASE ORAL at 08:05

## 2022-05-09 RX ADMIN — CLONIDINE HYDROCHLORIDE 0.1 MG: 0.1 TABLET ORAL at 03:05

## 2022-05-09 RX ADMIN — ROPINIROLE HYDROCHLORIDE 0.25 MG: 0.25 TABLET, FILM COATED ORAL at 02:05

## 2022-05-09 RX ADMIN — PIMAVANSERIN TARTRATE 34 MG: 34 CAPSULE ORAL at 08:05

## 2022-05-09 RX ADMIN — PRAVASTATIN SODIUM 20 MG: 10 TABLET ORAL at 08:05

## 2022-05-09 RX ADMIN — ROPINIROLE HYDROCHLORIDE 0.25 MG: 0.25 TABLET, FILM COATED ORAL at 08:05

## 2022-05-09 NOTE — HOSPITAL COURSE
71-year-old white male with severe Parkinson's disease presented to the emergency room 05/06/2022 with recurrent falls at home and labile blood pressure readings.  He was recently discharged from [facility] skilled nursing facility after a hospitalization last month.  Actually, he left from [facility] against medical advice because he and his wife were disappointed in the care he received there, they report.    On this hospitalization over the weekend, clonidine was added for as needed blood pressure control.     He lost 25 pounds in the past 18 months. He and his wife would like to pursue comfort care measures and are ready to initiate home hospice

## 2022-05-09 NOTE — PLAN OF CARE
Dr. Moore rounded earlier this am 8:20 and states he spoke to pt and fmly about home with hospice care.  Dr. Moore also states he has contacted [facility] and a rep. Will come to speak with pt and fmly.  If everything can be arranged with [facility] Dr. Moore plans d/c today.   Referral sent to [facility] via [facility]

## 2022-05-09 NOTE — PT/OT/SLP PROGRESS
Physical Therapy         Treatment        Buzz Fontenot   MRN: [id number]     PT Received On: 05/09/22  PT Start Time: 1045     PT Stop Time: 1110    PT Total Time (min): 25 min       Billable Minutes:  Therapeutic Activity 25 minutes  Total Minutes: 25    Treatment Type: Treatment  PT/PTA: PT     PTA Visit Number: 1       General Precautions: Standard, fall  Orthopedic Precautions: Orthopedic Precautions : N/A   Braces:      Spiritual, Cultural Beliefs, Evangelical Practices, Values that Affect Care: no    Subjective:  Communicated with nurse prior to session.         Objective:  Patient found in supine, with  spouse and family present in room.    Functional Mobility:  Bed Mobility:   Supine to sit: Moderate Assistance   Sit to supine: Moderate Assistance      Scooting: Minimal Assistance    Balance:   Static Sit: POOR+: Needs MINIMAL assist to maintain    Static Stand: POOR+: Needs MINIMAL assist to maintain      Transfer Training:  Sit to stand:Minimal Assistance with Rolling Walker        Additional Treatment:  Pt's BP taken in supine, sitting, and standing. Supine: 116/72 HR:83, Sitting EOB: 93/56 HR:85 (symptomatic), and unable to obtain BP in standing, pt symptomatic and needed to sit prior to BP being taken. Unsafe to progress mobility at this time 2/2 BP. Pt's symptoms resolved after returning to supine. Pt's HOB placed at 50 degrees in order to increase tolerance to upright. Will progress as tolerated.    Activity Tolerance:  Treatment limited secondary to medical complications orthostatic hypotension.    Patient left HOB elevated with all lines intact, call button in reach, NSG notified and spouse and family present.    Assessment:  Buzz Fontenot is a 71 y.o. male with a medical diagnosis of Syncope and collapse. He presents with frequent falls at home. Likely 2/2 orthostatic hypotension.    Rehab potential is fair.    Activity tolerance: poor    Discharge recommendations: Discharge Facility/Level of Care  Needs: home with hospice, other (see comments) (home with 24 hr caregiver support)       GOALS:   Multidisciplinary Problems     Physical Therapy Goals        Problem: Physical Therapy    Goal Priority Disciplines Outcome Goal Variances Interventions   Physical Therapy Goal     PT, PT/OT Ongoing, Progressing     Description: Goals to be met by: 22     Patient will increase functional independence with mobility by performin. Supine to sit with stand by assist  2. Sit to supine with stand by assist  3. Sit to stand transfer with Stand-by Assistance  4. Gait  x 150 feet with Minimal Assistance using Rolling Walker.                      PLAN:    Patient to be seen daily  to address the above listed problems via therapeutic activities, therapeutic exercises, gait training  Plan of Care expires: 22  Plan of Care reviewed with: patient, spouse         2022

## 2022-05-09 NOTE — HPI
The patient is a 71-year-old man who suffers from fairly severe Parkinson's disease and associated orthostatic hypotension.  His problems have been getting worse over the past few weeks and has been falling every few hours.  5-6 times per day he collapses.  He has been worked up extensively apparently for this problem.  He see Dr. Becerra for his Parkinson's disease and recently increased his dose of droxydopa from 100 t.i.d. to 200 mg t.i.d. he was scheduled to see Dr. Efraín mendenhall in the office today but had another fall and severe drop in blood pressure.  He was brought to the hospital emergency room and was evaluated and admitted to Dr. Moore service.

## 2022-05-09 NOTE — PROGRESS NOTES
[facility]  [facility]  Progress Note    Patient Name: Buzz Fontenot  MRN: [id number]  Patient Class: IP- Inpatient   Admission Date: 5/6/2022  Length of Stay: 3 days  Attending Physician: Sean Moore II, MD  Primary Care Provider: Sean Moore Ii, MD        Subjective:     Principal Problem:Syncope and collapse        HPI:  The patient is a 71-year-old man who suffers from fairly severe Parkinson's disease and associated orthostatic hypotension.  His problems have been getting worse over the past few weeks and has been falling every few hours.  5-6 times per day he collapses.  He has been worked up extensively apparently for this problem.  He see Dr. Becerra for his Parkinson's disease and recently increased his dose of droxydopa from 100 t.i.d. to 200 mg t.i.d. he was scheduled to see Dr. Efraín mendenhall in the office today but had another fall and severe drop in blood pressure.  He was brought to the hospital emergency room and was evaluated and admitted to Dr. Moore service.      Overview/Hospital Course:  71-year-old white male with severe Parkinson's disease presented to the emergency room 05/06/2022 with recurrent falls at home and labile blood pressure readings.  He was recently discharged from [facility] skilled [facility] after a hospitalization last month.  Actually, he left from [facility] against medical advice because he and his wife were disappointed in the care he received there, they report.    On this hospitalization over the weekend, clonidine was added for as needed blood pressure control.      Interval History:  71-year-old male reports ongoing weakness and recurrent falls.  Stable overnight otherwise    Review of Systems   Constitutional:  Negative for chills.   HENT:  Negative for nosebleeds.    Eyes:  Negative for redness.   Respiratory:  Negative for shortness of breath.    Cardiovascular:  Negative for chest pain.   Gastrointestinal:   Negative for abdominal pain.   Genitourinary:  Negative for dysuria.   Musculoskeletal:  Negative for back pain.   Neurological:  Negative for headaches.   Psychiatric/Behavioral:  Negative for behavioral problems.    Objective:     Vital Signs (Most Recent):  Temp: 98.7 °F (37.1 °C) (05/09/22 1211)  Pulse: 84 (05/09/22 1211)  Resp: 17 (05/09/22 1211)  BP: 114/73 (05/09/22 1211)  SpO2: 95 % (05/09/22 1211) Vital Signs (24h Range):  Temp:  [97.2 °F (36.2 °C)-98.7 °F (37.1 °C)] 98.7 °F (37.1 °C)  Pulse:  [80-88] 84  Resp:  [17-18] 17  SpO2:  [95 %-97 %] 95 %  BP: (114-195)/() 114/73     Weight: 72.6 kg (160 lb)  Body mass index is 24.33 kg/m².    Intake/Output Summary (Last 24 hours) at 5/9/2022 1501  Last data filed at 5/9/2022 1400  Gross per 24 hour   Intake 896 ml   Output 450 ml   Net 446 ml      Physical Exam  Vitals reviewed.   Eyes:      Extraocular Movements: Extraocular movements intact.      Pupils: Pupils are equal, round, and reactive to light.   Cardiovascular:      Rate and Rhythm: Normal rate and regular rhythm.   Pulmonary:      Effort: Pulmonary effort is normal.      Breath sounds: Normal breath sounds.   Abdominal:      General: Bowel sounds are normal.      Palpations: Abdomen is soft.   Neurological:      General: No focal deficit present.      Mental Status: He is alert.   Psychiatric:         Mood and Affect: Mood normal.       Significant Labs: All pertinent labs within the past 24 hours have been reviewed.    Significant Imaging: I have reviewed all pertinent imaging results/findings within the past 24 hours.      Assessment/Plan:      Parkinson's disease  Continue current medication regimen.  Patient and his wife would like to proceed with home hospice      Orthostatic hypotension  He has recurrent falls at home.      Hypertension  Clonidine as needed        VTE Risk Mitigation (From admission, onward)         Ordered     Place sequential compression device  Until discontinued          05/06/22 1738                Discharge Planning   ALEKSANDRA: 5/10/2022     Code Status: Not on file   Is the patient medically ready for discharge?:     Reason for patient still in hospital (select all that apply): Pending disposition                     Sean Moore Ii, MD  Department of [facility] Medicine   [facility] Acute

## 2022-05-09 NOTE — ASSESSMENT & PLAN NOTE
Continue current medication regimen.  Patient and his wife would like to proceed with home hospice

## 2022-05-09 NOTE — PLAN OF CARE
OT reports that pt exhibited 2 episodes of orthostatic hypotension and was symptomatic.  Farnaz RN with [facility] came and spoke to pt and spouse- Farnaz states that d/c will be tomorrow morning, as spouse would prefer am d/c. Farnaz states she will reach out to Dr. Moore.

## 2022-05-09 NOTE — PLAN OF CARE
Dr. Moore rounded @ 8:20 and states he spoke to pt and fmly about Hospice services at home and they are agreeable.  Dr. Moore has contacted the rep for [facility] and states they will come see the pt and if all can be arranged he will d/c the pt home today with their services. I went to the pt's room and spoke to pt and fmly re: DR. Moore's  Plan and they are in agreement and choice is [facility]. Referral sent via [facility] to [facility] ph# [phone]

## 2022-05-09 NOTE — SUBJECTIVE & OBJECTIVE
Interval History:  71-year-old male reports ongoing weakness and recurrent falls.  Stable overnight otherwise    Review of Systems   Constitutional:  Negative for chills.   HENT:  Negative for nosebleeds.    Eyes:  Negative for redness.   Respiratory:  Negative for shortness of breath.    Cardiovascular:  Negative for chest pain.   Gastrointestinal:  Negative for abdominal pain.   Genitourinary:  Negative for dysuria.   Musculoskeletal:  Negative for back pain.   Neurological:  Negative for headaches.   Psychiatric/Behavioral:  Negative for behavioral problems.    Objective:     Vital Signs (Most Recent):  Temp: 98.7 °F (37.1 °C) (05/09/22 1211)  Pulse: 84 (05/09/22 1211)  Resp: 17 (05/09/22 1211)  BP: 114/73 (05/09/22 1211)  SpO2: 95 % (05/09/22 1211) Vital Signs (24h Range):  Temp:  [97.2 °F (36.2 °C)-98.7 °F (37.1 °C)] 98.7 °F (37.1 °C)  Pulse:  [80-88] 84  Resp:  [17-18] 17  SpO2:  [95 %-97 %] 95 %  BP: (114-195)/() 114/73     Weight: 72.6 kg (160 lb)  Body mass index is 24.33 kg/m².    Intake/Output Summary (Last 24 hours) at 5/9/2022 1501  Last data filed at 5/9/2022 1400  Gross per 24 hour   Intake 896 ml   Output 450 ml   Net 446 ml      Physical Exam  Vitals reviewed.   Eyes:      Extraocular Movements: Extraocular movements intact.      Pupils: Pupils are equal, round, and reactive to light.   Cardiovascular:      Rate and Rhythm: Normal rate and regular rhythm.   Pulmonary:      Effort: Pulmonary effort is normal.      Breath sounds: Normal breath sounds.   Abdominal:      General: Bowel sounds are normal.      Palpations: Abdomen is soft.   Neurological:      General: No focal deficit present.      Mental Status: He is alert.   Psychiatric:         Mood and Affect: Mood normal.       Significant Labs: All pertinent labs within the past 24 hours have been reviewed.    Significant Imaging: I have reviewed all pertinent imaging results/findings within the past 24 hours.

## 2022-05-10 VITALS
BODY MASS INDEX: 24.25 KG/M2 | OXYGEN SATURATION: 97 % | TEMPERATURE: 98 F | HEIGHT: 68 IN | DIASTOLIC BLOOD PRESSURE: 83 MMHG | WEIGHT: 160 LBS | HEART RATE: 85 BPM | RESPIRATION RATE: 19 BRPM | SYSTOLIC BLOOD PRESSURE: 133 MMHG

## 2022-05-10 PROBLEM — Z51.5 COMFORT MEASURES ONLY STATUS: Status: ACTIVE | Noted: 2022-05-10

## 2022-05-10 PROCEDURE — 25000242 PHARM REV CODE 250 ALT 637 W/ HCPCS: Performed by: INTERNAL MEDICINE

## 2022-05-10 PROCEDURE — 99239 PR HOSPITAL DISCHARGE DAY,>30 MIN: ICD-10-PCS | Mod: ,,, | Performed by: INTERNAL MEDICINE

## 2022-05-10 PROCEDURE — 99239 HOSP IP/OBS DSCHRG MGMT >30: CPT | Mod: ,,, | Performed by: INTERNAL MEDICINE

## 2022-05-10 PROCEDURE — 25000003 PHARM REV CODE 250: Performed by: INTERNAL MEDICINE

## 2022-05-10 RX ORDER — DULOXETIN HYDROCHLORIDE 60 MG/1
60 CAPSULE, DELAYED RELEASE ORAL NIGHTLY
Qty: 30 CAPSULE | Refills: 11 | Status: SHIPPED | OUTPATIENT
Start: 2022-05-10 | End: 2022-08-12

## 2022-05-10 RX ORDER — DULOXETIN HYDROCHLORIDE 30 MG/1
60 CAPSULE, DELAYED RELEASE ORAL NIGHTLY
Status: DISCONTINUED | OUTPATIENT
Start: 2022-05-10 | End: 2022-05-10 | Stop reason: HOSPADM

## 2022-05-10 RX ORDER — ALPRAZOLAM 0.5 MG/1
0.5 TABLET, EXTENDED RELEASE ORAL NIGHTLY
Qty: 30 TABLET | Refills: 0
Start: 2022-05-10 | End: 2022-08-01

## 2022-05-10 RX ADMIN — PIMAVANSERIN TARTRATE 34 MG: 34 CAPSULE ORAL at 09:05

## 2022-05-10 RX ADMIN — ROPINIROLE HYDROCHLORIDE 0.25 MG: 0.25 TABLET, FILM COATED ORAL at 07:05

## 2022-05-10 RX ADMIN — BUPROPION HYDROCHLORIDE 300 MG: 150 TABLET, FILM COATED, EXTENDED RELEASE ORAL at 06:05

## 2022-05-10 RX ADMIN — PANTOPRAZOLE SODIUM 40 MG: 40 TABLET, DELAYED RELEASE ORAL at 07:05

## 2022-05-10 RX ADMIN — PRAVASTATIN SODIUM 20 MG: 10 TABLET ORAL at 07:05

## 2022-05-10 NOTE — PLAN OF CARE
Problem: Adult Inpatient Plan of Care  Goal: Plan of Care Review  Outcome: Ongoing, Progressing  Goal: Patient-Specific Goal (Individualized)  Outcome: Ongoing, Progressing  Goal: Absence of Hospital-Acquired Illness or Injury  Outcome: Ongoing, Progressing  Intervention: Identify and Manage Fall Risk  Flowsheets (Taken 5/10/2022 0356)  Safety Promotion/Fall Prevention:   bed alarm set   nonskid shoes/socks when out of bed   side rails raised x 3   toileting scheduled  Intervention: Prevent Infection  Flowsheets (Taken 5/10/2022 0356)  Infection Prevention: hand hygiene promoted  Goal: Optimal Comfort and Wellbeing  Outcome: Ongoing, Progressing  Intervention: Monitor Pain and Promote Comfort  Flowsheets (Taken 5/10/2022 0356)  Pain Management Interventions:   quiet environment facilitated   care clustered  Goal: Readiness for Transition of Care  Outcome: Ongoing, Progressing     Problem: Skin Injury Risk Increased  Goal: Skin Health and Integrity  Outcome: Ongoing, Progressing

## 2022-05-10 NOTE — NURSING
Patient discharge to home with hospice via [facility] at 1250. IV removed and catheter tip in tact. Educated patient on orthostatic hypotension and hospice care. Patient and family verbalized understanding and all questions were answered at this time. Patient left the floor via stretcher with MD [facility].

## 2022-05-10 NOTE — PLAN OF CARE
[facility] here to  pt they state because pt can sit up insurance may not pay pts wife states she cant take him home by car and he cant go in a wheelchair states it will be difficult for them to pay for the ambulance, spoke with Jossue she states if hospice wont pay we can use hospital contract, spoke with Farnaz at [facility] she states that is not part of the service they offer notified Ramona at [facility] to put on hosp contract per CM admin,

## 2022-05-10 NOTE — PLAN OF CARE
Spoke with Farnaz equipment will be delivered at 12:30 today and they are ready for pt d/c order and [facility] @ [phone] notified will call with BASSAM

## 2022-05-10 NOTE — PLAN OF CARE
Spoke with Farnaz at [facility] re: d/c today she states she is waiting to speak with Dr Moore  About cetain medications pt is on and if meds need to be continued, she is waiting on a return call and will let me know as soon as possible

## 2022-05-10 NOTE — DISCHARGE SUMMARY
[facility]  Hospital Medicine  Discharge Summary      Patient Name: Buzz Fontenot  MRN: [id number]  Patient Class: IP- Inpatient  Admission Date: 5/6/2022  Hospital Length of Stay: 4 days  Discharge Date and Time:  05/10/2022 8:22 AM  Attending Physician: Sean Moore II, MD   Discharging Provider: Sean Moore Ii, MD  Primary Care Provider: Sean Moore Ii, MD      HPI:   The patient is a 71-year-old man who suffers from fairly severe Parkinson's disease and associated orthostatic hypotension.  His problems have been getting worse over the past few weeks and has been falling every few hours.  5-6 times per day he collapses.  He has been worked up extensively apparently for this problem.  He see Dr. Becerra for his Parkinson's disease and recently increased his dose of droxydopa from 100 t.i.d. to 200 mg t.i.d. he was scheduled to see Dr. Efraín mendenhall in the office today but had another fall and severe drop in blood pressure.  He was brought to the hospital emergency room and was evaluated and admitted to Dr. Moore service.      * No surgery found *      Hospital Course:   71-year-old white male with severe Parkinson's disease presented to the emergency room 05/06/2022 with recurrent falls at home and labile blood pressure readings.  He was recently discharged from [facility] skilled nursing [facility] after a hospitalization last month.  Actually, he left from [facility] against medical advice because he and his wife were disappointed in the care he received there, they report.    On this hospitalization over the weekend, clonidine was added for as needed blood pressure control.     He lost 25 pounds in the past 18 months. He and his wife would like to pursue comfort care measures and are ready to initiate home hospice       Goals of Care Treatment Preferences:         Consults:   Consults (From admission, onward)        Status Ordering Provider     Inpatient consult to  Social Work  Once        Provider:  (Not yet assigned)    Acknowledged JACKIE OSBORN II     Inpatient consult to Cardiology  Once        Provider:  Lidia Huynh MD    Completed SHAWN SAGASTUME          * Syncope and collapse  Labile BP readings.  Clonidine as needed      Parkinson's disease  Continue current medication regimen.  Patient and his wife would like to proceed with home hospice        Final Active Diagnoses:    Diagnosis Date Noted POA    PRINCIPAL PROBLEM:  Syncope and collapse [R55] 05/06/2022 Yes    Parkinson's disease [G20] 05/06/2022 Yes    Comfort measures only status [Z51.5] 05/10/2022 Not Applicable    Hypertension [I10] 05/06/2022 Yes    Orthostatic hypotension [I95.1] 05/06/2022 Yes      Problems Resolved During this Admission:       Discharged Condition: poor    Disposition: Hospice/Home    Follow Up:    Patient Instructions:      Diet Adult Regular     Diet Adult Regular     Activity as tolerated       Significant Diagnostic Studies: Labs:   BMP:   Recent Labs   Lab 05/09/22  0358   CO2 26   BUN 11.4   CREATININE 0.95   CALCIUM 9.0       Pending Diagnostic Studies:     None         Medications:  Reconciled Home Medications:      Medication List      START taking these medications    ALPRAZolam 0.5 MG Tb24  Commonly known as: XANAX XR  Take 1 tablet (0.5 mg total) by mouth nightly.     cloNIDine 0.1 MG tablet  Commonly known as: CATAPRES  Take 1 tablet (0.1 mg total) by mouth every 4 (four) hours as needed (as needed if systolic pressure is greater than 180).        CHANGE how you take these medications    * DULoxetine 60 MG capsule  Commonly known as: CYMBALTA  Take 90 mg by mouth nightly.  What changed: Another medication with the same name was changed. Make sure you understand how and when to take each.     * DULoxetine 60 MG capsule  Commonly known as: CYMBALTA  Take 1 capsule (60 mg total) by mouth nightly.  What changed:   · medication strength  · how much to take  · when to  take this         * This list has 2 medication(s) that are the same as other medications prescribed for you. Read the directions carefully, and ask your doctor or other care provider to review them with you.            CONTINUE taking these medications    NUPLAZID 34 mg Cap  Generic drug: pimavanserin  Take 1 capsule by mouth once daily.     pantoprazole 40 MG tablet  Commonly known as: PROTONIX  Take 40 mg by mouth once daily.     RYTARY 36. mg Cpsr  Generic drug: carbidopa-levodopa  Take 2 capsules by mouth 5 (five) times daily.        STOP taking these medications    amLODIPine 5 MG tablet  Commonly known as: NORVASC     APOKYN 10 mg/mL Crtg  Generic drug: apomorphine     buPROPion 150 MG TB24 tablet  Commonly known as: WELLBUTRIN XL     clonazePAM 0.5 MG tablet  Commonly known as: KlonoPIN     cyanocobalamin 500 MCG tablet     ketoconazole 2 % shampoo  Commonly known as: NIZORAL     promethazine 25 MG tablet  Commonly known as: PHENERGAN     rOPINIRole 0.25 MG tablet  Commonly known as: REQUIP     simvastatin 40 MG tablet  Commonly known as: ZOCOR     traZODone 100 MG tablet  Commonly known as: DESYREL            Indwelling Lines/Drains at time of discharge:   Lines/Drains/Airways     None                 Time spent on the discharge of patient: 40 minutes         Sean Moore II, MD  Department of Hospital Medicine  [facility]

## 2022-05-14 NOTE — OP NOTE
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    PROCEDURE:  Pars plana vitrectomy with pars plana lensectomy and secondary intraocular lens placement with the Yamane technique to the left eye.    ANESTHESIA:  General.    ESTIMATED BLOOD LOSS:  Less than 5 cc.    COMPLICATIONS:  None.    PROCEDURE INDICATIONS:  Mr. Fontenot has a history of cataract surgery with torn posterior capsule.  He was left aphakic because of weak capsule remnants and requires a vitrectomy and secondary intraocular lens placement.    DESCRIPTION OF PROCEDURE:  The patient was taken to the operative theater, where general anesthesia was begun.  The left eye was prepped and draped in the normal sterile fashion and a lid speculum was applied.  A standard 3-port 25-gauge pars plana vitrectomy was performed with all trocars being placed 3.5 mm from the surgical limbus.  A core vitrectomy was performed.  The remaining posterior capsule as well as some residual cortex was removed using vitrectomy cutter and a 25-gauge retinal forceps.  A core vitrectomy was then performed removing the posterior hyaloid out to the peripheral retina.  The peripheral retina was examined with scleral depression and no retinal breaks were identified.  A secondary intraocular lens was then placed via the Yamane technique.  A corneal marker was used to jon the 9 and 3 o'clock positions of the cornea and sclerotomy sites were marked 2 mm posterior to the limbus at 9 and 3 o'clock using calipers.  A superior 3 mm clear corneal incision was made with a keratome blade at 12 o'clock.  The anterior chamber was filled with Healon.  A 20 diopter [id number] 3-piece intra-ocular lens was placed into the anterior chamber with the lens shooter.  Bent 30-gauge needles were entered through the sclerotomies at 9 and 3 o'clock positions and a 25-gauge Endo  forceps was used to place the haptics in the needles.  The needles were removed.  Externalizing the haptics, which were then cauterized creating a nailhead configuration of the tip of the haptic, which was then buried into bare sclera.  This fixed the intraocular lens in the visual axis.  The retina was then examined again and there were no signs of retinal breaks.  A vitrectomy cutter was used to remove the Healon solution.  One 10-0 nylon suture was used to close the 12 o'clock corneal wound.  The trocars were removed from the sclerotomies and the sclerotomies were massaged closed with cotton-tip swabs.  Several drops of TobraDex ophthalmic solution were applied to the eye.  The postoperative intraocular pressure was 15 mmHg.  The lid speculum and eye drape were then removed and the eye was covered with a gauze patch and a Erickson shield.  The patient was then transported to the postoperative care unit to recover.    DISCHARGE CONDITION:  Good.    DISPOSITION:  Home with followup with Dr. Worley the following day.  This patient tolerated the procedure well.        ______________________________  MD KERMIT Frey/UY  DD:  02/21/2022  Time:  01:04PM  DT:  02/21/2022  Time:  01:27PM  Job #:  [id number]

## 2022-08-01 DIAGNOSIS — U07.1 COVID: Primary | ICD-10-CM

## 2022-08-01 RX ORDER — TAMSULOSIN HYDROCHLORIDE 0.4 MG/1
1 CAPSULE ORAL DAILY
COMMUNITY
Start: 2022-07-20 | End: 2022-08-01 | Stop reason: SDUPTHER

## 2022-08-01 RX ORDER — TAMSULOSIN HYDROCHLORIDE 0.4 MG/1
1 CAPSULE ORAL DAILY
Qty: 30 CAPSULE | Refills: 5 | Status: SHIPPED | OUTPATIENT
Start: 2022-08-01 | End: 2022-11-09

## 2022-08-01 RX ORDER — CLONAZEPAM 0.5 MG/1
0.5 TABLET ORAL 2 TIMES DAILY
COMMUNITY
Start: 2022-05-26 | End: 2022-08-01 | Stop reason: SDUPTHER

## 2022-08-01 RX ORDER — CLONAZEPAM 0.5 MG/1
0.5 TABLET ORAL 2 TIMES DAILY
Qty: 60 TABLET | Refills: 1 | Status: SHIPPED | OUTPATIENT
Start: 2022-08-01

## 2022-08-01 RX ORDER — ASPIRIN 81 MG/1
81 TABLET ORAL
COMMUNITY
End: 2022-08-12

## 2022-08-01 NOTE — TELEPHONE ENCOUNTER
----- Message from Sean Moore II, MD sent at 8/1/2022  9:19 AM CDT -----  Regarding: RE: med  Paxlovid for covid, okay for refills  ----- Message -----  From: Araceli Chavez LPN  Sent: 8/1/2022   8:58 AM CDT  To: Sean Moore II, MD  Subject: FW: med                                            ----- Message -----  From: Afshan Hong  Sent: 8/1/2022   8:41 AM CDT  To: Araceli Chavez LPN  Subject: med                                              Pt's wife Latha cancelled appt today, pt has covid.  She is req med.  Also refill of clonazepam & flomax  [facility]  [phone]

## 2022-08-09 ENCOUNTER — TELEPHONE (OUTPATIENT)
Dept: INTERNAL MEDICINE | Facility: CLINIC | Age: 72
End: 2022-08-09
Payer: MEDICARE

## 2022-08-09 DIAGNOSIS — U07.1 COVID: Primary | ICD-10-CM

## 2022-08-09 RX ORDER — AZITHROMYCIN 500 MG/1
500 TABLET, FILM COATED ORAL DAILY
Qty: 5 TABLET | Refills: 0 | Status: SHIPPED | OUTPATIENT
Start: 2022-08-09 | End: 2022-08-14

## 2022-08-09 RX ORDER — AZELASTINE 1 MG/ML
1 SPRAY, METERED NASAL 2 TIMES DAILY
Qty: 30 ML | Refills: 0 | Status: SHIPPED | OUTPATIENT
Start: 2022-08-09 | End: 2022-08-12

## 2022-08-09 NOTE — TELEPHONE ENCOUNTER
----- Message from Sean Moore II, MD sent at 8/9/2022 11:39 AM CDT -----  Regarding: RE: call  Azithromycin 500 daily for 5 days  Azelastine BID for 2 weeks  ----- Message -----  From: Araceli Chavez LPN  Sent: 8/9/2022  11:33 AM CDT  To: Sean Moore II, MD  Subject: FW: call                                           ----- Message -----  From: Afshan Hong  Sent: 8/9/2022  10:36 AM CDT  To: Araceli Chavez LPN  Subject: call                                             Pt's wife Latha is req a callback.  Her  is very weak, congested, coughing all due to post covid.  [phone]

## 2022-08-10 RX ORDER — QUETIAPINE FUMARATE 50 MG/1
50 TABLET, FILM COATED ORAL NIGHTLY
COMMUNITY
Start: 2022-05-26 | End: 2022-08-19 | Stop reason: SDUPTHER

## 2022-08-10 RX ORDER — ALPRAZOLAM 0.5 MG/1
0.5 TABLET ORAL NIGHTLY
Qty: 90 TABLET | Refills: 3 | Status: SHIPPED | OUTPATIENT
Start: 2022-08-10 | End: 2022-08-12

## 2022-08-10 RX ORDER — SERTRALINE HYDROCHLORIDE 50 MG/1
50 TABLET, FILM COATED ORAL DAILY
Qty: 30 TABLET | Refills: 3 | Status: SHIPPED | OUTPATIENT
Start: 2022-08-10 | End: 2022-08-19 | Stop reason: SDUPTHER

## 2022-08-10 RX ORDER — KETOCONAZOLE 20 MG/ML
SHAMPOO, SUSPENSION TOPICAL
COMMUNITY
Start: 2022-08-05 | End: 2022-08-12

## 2022-08-10 RX ORDER — DROXIDOPA 200 MG/1
CAPSULE ORAL
COMMUNITY
Start: 2022-04-19 | End: 2022-08-12

## 2022-08-10 RX ORDER — MIRTAZAPINE 15 MG/1
15 TABLET, FILM COATED ORAL NIGHTLY
Qty: 30 TABLET | Refills: 3 | Status: SHIPPED | OUTPATIENT
Start: 2022-08-10 | End: 2022-11-09

## 2022-08-10 RX ORDER — SERTRALINE HYDROCHLORIDE 50 MG/1
50 TABLET, FILM COATED ORAL DAILY
COMMUNITY
Start: 2022-05-26 | End: 2022-08-10 | Stop reason: SDUPTHER

## 2022-08-10 NOTE — TELEPHONE ENCOUNTER
----- Message from Sean Moore II, MD sent at 8/10/2022  9:58 AM CDT -----  Regarding: RE: med refill  Ok for these  ----- Message -----  From: Araceli Chavez LPN  Sent: 8/10/2022   9:45 AM CDT  To: Sean Moore II, MD  Subject: FW: med refill                                   Please advise about these medications.   ----- Message -----  From: Reny Savage Patient Care Assistant  Sent: 8/10/2022   8:39 AM CDT  To: Araceli Chavez LPN  Subject: med refill                                       Type:  RX Refill Request    Who Called: pt    Refill or New Rx: refill    RX Name and Strength: Seroquel 50 mg   How is the patient currently taking it? (ex. 1XDay): 1 tablet daily at night  Is this a 30 day or 90 day RX: 30    RX Name and Strength: Mirtazapine 15 mg  How is the patient currently taking it? (ex. 1XDay): take 2 tablets by mouth for 30 mg/day   Is this a 30 day or 90 day RX: 30    RX Name and Strength: Alprazolam 0.5 mg   How is the patient currently taking it? (ex. 1XDay): 1 tablet at night  Is this a 30 day or 90 day RX: 30    Preferred Pharmacy with phone number: [facility] on [address]    Would the patient rather a call back or a response via [facility]?  C/b if needed    Best Call Back Number: [phone]    Additional Information: pt needing refill on meds please

## 2022-08-10 NOTE — TELEPHONE ENCOUNTER
----- Message from Reny Savage Patient Care Assistant sent at 8/10/2022  8:34 AM CDT -----  Regarding: med refill  Type:  RX Refill Request    Who Called: pt    Refill or New Rx: refill    RX Name and Strength: Seroquel 50 mg   How is the patient currently taking it? (ex. 1XDay): 1 tablet daily at night  Is this a 30 day or 90 day RX: 30    RX Name and Strength: Mirtazapine 15 mg  How is the patient currently taking it? (ex. 1XDay): take 2 tablets by mouth for 30 mg/day   Is this a 30 day or 90 day RX: 30    RX Name and Strength: Alprazolam 0.5 mg   How is the patient currently taking it? (ex. 1XDay): 1 tablet at night  Is this a 30 day or 90 day RX: 30    Preferred Pharmacy with phone number: [facility] on [address]    Would the patient rather a call back or a response via [facility]?  C/b if needed    Best Call Back Number: [phone]    Additional Information: pt needing refill on meds please

## 2022-08-10 NOTE — TELEPHONE ENCOUNTER
----- Message from Sean Moore II, MD sent at 8/10/2022  4:26 PM CDT -----  No, if on clonazepam BID, he cannot also be on Xanax. My mistake  ----- Message -----  From: Araceli Chavez LPN  Sent: 8/10/2022   4:01 PM CDT  To: Sean Moore II, MD      ----- Message -----  From: Bethanie Nazario  Sent: 8/10/2022   3:58 PM CDT  To: CORRIE Soto, pharmacist with [facility] called and has questions about medication sent in for Alprazolam .5mgat bedtime at Clonazepam .5mg Bid. Should he be on both medications  [phone]

## 2022-08-12 ENCOUNTER — OFFICE VISIT (OUTPATIENT)
Dept: INTERNAL MEDICINE | Facility: CLINIC | Age: 72
End: 2022-08-12
Payer: MEDICARE

## 2022-08-12 VITALS
WEIGHT: 160 LBS | HEIGHT: 67 IN | SYSTOLIC BLOOD PRESSURE: 100 MMHG | HEART RATE: 64 BPM | TEMPERATURE: 99 F | DIASTOLIC BLOOD PRESSURE: 62 MMHG | OXYGEN SATURATION: 98 % | RESPIRATION RATE: 14 BRPM | BODY MASS INDEX: 25.11 KG/M2

## 2022-08-12 DIAGNOSIS — I10 PRIMARY HYPERTENSION: ICD-10-CM

## 2022-08-12 DIAGNOSIS — E78.5 HYPERLIPIDEMIA, UNSPECIFIED HYPERLIPIDEMIA TYPE: ICD-10-CM

## 2022-08-12 DIAGNOSIS — D64.9 CHRONIC ANEMIA: Primary | ICD-10-CM

## 2022-08-12 DIAGNOSIS — R42 DISEQUILIBRIUM: ICD-10-CM

## 2022-08-12 DIAGNOSIS — G20.A1 PARKINSON'S DISEASE: ICD-10-CM

## 2022-08-12 DIAGNOSIS — F32.5 MAJOR DEPRESSION IN REMISSION: ICD-10-CM

## 2022-08-12 DIAGNOSIS — E53.8 VITAMIN B12 DEFICIENCY: ICD-10-CM

## 2022-08-12 DIAGNOSIS — I95.1 ORTHOSTATIC HYPOTENSION: ICD-10-CM

## 2022-08-12 DIAGNOSIS — R53.1 WEAKNESS: ICD-10-CM

## 2022-08-12 PROCEDURE — 99214 PR OFFICE/OUTPT VISIT, EST, LEVL IV, 30-39 MIN: ICD-10-PCS | Mod: ,,, | Performed by: INTERNAL MEDICINE

## 2022-08-12 PROCEDURE — 99214 OFFICE O/P EST MOD 30 MIN: CPT | Mod: ,,, | Performed by: INTERNAL MEDICINE

## 2022-08-12 RX ORDER — ROPINIROLE 0.5 MG/1
0.5 TABLET, FILM COATED ORAL 2 TIMES DAILY
COMMUNITY
End: 2022-09-07 | Stop reason: SDUPTHER

## 2022-08-12 RX ORDER — VITAMIN B COMPLEX
1 CAPSULE ORAL
COMMUNITY
End: 2022-08-12
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
## 2022-08-19 DIAGNOSIS — F32.5 MAJOR DEPRESSION IN REMISSION: Primary | ICD-10-CM

## 2022-08-19 RX ORDER — QUETIAPINE FUMARATE 50 MG/1
50 TABLET, FILM COATED ORAL NIGHTLY
Qty: 30 TABLET | Refills: 11 | Status: SHIPPED | OUTPATIENT
Start: 2022-08-19 | End: 2023-11-22

## 2022-08-19 RX ORDER — SERTRALINE HYDROCHLORIDE 50 MG/1
50 TABLET, FILM COATED ORAL DAILY
Qty: 30 TABLET | Refills: 11 | Status: SHIPPED | OUTPATIENT
Start: 2022-08-19 | End: 2022-11-09

## 2022-08-19 NOTE — TELEPHONE ENCOUNTER
----- Message from Reny Savage, Patient Care Assistant sent at 8/19/2022  9:09 AM CDT -----  Regarding: med refill x2  Type:  RX Refill Request    RX Name and Strength: sertraline (ZOLOFT) 50 MG tablet    How is the patient currently taking it? (ex. 1XDay): Take 1 tablet (50 mg total) by mouth once daily.     Is this a 30 day or 90 day RX: 30      RX Name and Strength: QUEtiapine (SEROQUEL) 50 MG tablet    How is the patient currently taking it? (ex. 1XDay):  Take 50 mg by mouth nightly    Is this a 30 day or 90 day RX:30      Preferred Pharmacy with phone number: [facility] - [place], [place] - [address]      Additional Information: pt's wife is req refill on these two meds please for pt

## 2022-08-29 ENCOUNTER — TELEPHONE (OUTPATIENT)
Dept: INTERNAL MEDICINE | Facility: CLINIC | Age: 72
End: 2022-08-29

## 2022-08-29 DIAGNOSIS — K21.9 GASTROESOPHAGEAL REFLUX DISEASE, UNSPECIFIED WHETHER ESOPHAGITIS PRESENT: Primary | ICD-10-CM

## 2022-08-29 RX ORDER — PANTOPRAZOLE SODIUM 40 MG/1
40 TABLET, DELAYED RELEASE ORAL DAILY
Qty: 90 TABLET | Refills: 3 | Status: SHIPPED | OUTPATIENT
Start: 2022-08-29 | End: 2023-08-24

## 2022-08-29 NOTE — TELEPHONE ENCOUNTER
----- Message from Afshan Hong sent at 8/29/2022 10:14 AM CDT -----  Regarding: med  Pt's wife is req refill of Pantoprazole 40 mg  [facility] pharmacy  [phone]

## 2022-09-07 ENCOUNTER — TELEPHONE (OUTPATIENT)
Dept: INTERNAL MEDICINE | Facility: CLINIC | Age: 72
End: 2022-09-07
Payer: MEDICARE

## 2022-09-07 DIAGNOSIS — G20.A1 PARKINSON'S DISEASE: Primary | ICD-10-CM

## 2022-09-07 RX ORDER — ROPINIROLE 0.5 MG/1
0.5 TABLET, FILM COATED ORAL 2 TIMES DAILY
Qty: 60 TABLET | Refills: 5 | Status: SHIPPED | OUTPATIENT
Start: 2022-09-07 | End: 2022-10-07

## 2022-09-07 NOTE — TELEPHONE ENCOUNTER
----- Message from Afshan Hong sent at 9/7/2022  8:35 AM CDT -----  Regarding: med    New script ropinirole 0.5 mg- [facility] (hospice was previously filling)  Rqvl - [phone]

## 2022-10-11 DIAGNOSIS — G20.A1 PARKINSON'S DISEASE: ICD-10-CM

## 2022-10-11 DIAGNOSIS — Z00.00 WELL ADULT EXAM: Primary | ICD-10-CM

## 2022-10-11 DIAGNOSIS — I10 PRIMARY HYPERTENSION: ICD-10-CM

## 2022-10-11 DIAGNOSIS — I25.10 CORONARY ARTERY DISEASE, UNSPECIFIED VESSEL OR LESION TYPE, UNSPECIFIED WHETHER ANGINA PRESENT, UNSPECIFIED WHETHER NATIVE OR TRANSPLANTED HEART: ICD-10-CM

## 2022-10-11 DIAGNOSIS — F32.5 MAJOR DEPRESSION IN REMISSION: ICD-10-CM

## 2022-10-11 DIAGNOSIS — E53.8 VITAMIN B12 DEFICIENCY: ICD-10-CM

## 2022-10-11 DIAGNOSIS — I95.1 ORTHOSTATIC HYPOTENSION: ICD-10-CM

## 2022-10-11 DIAGNOSIS — D64.9 ANEMIA, UNSPECIFIED TYPE: ICD-10-CM

## 2022-10-11 DIAGNOSIS — E78.5 HYPERLIPIDEMIA, UNSPECIFIED HYPERLIPIDEMIA TYPE: ICD-10-CM

## 2022-11-09 ENCOUNTER — OFFICE VISIT (OUTPATIENT)
Dept: INTERNAL MEDICINE | Facility: CLINIC | Age: 72
End: 2022-11-09
Payer: MEDICARE

## 2022-11-09 VITALS
HEIGHT: 67 IN | TEMPERATURE: 98 F | OXYGEN SATURATION: 98 % | RESPIRATION RATE: 16 BRPM | BODY MASS INDEX: 25.11 KG/M2 | HEART RATE: 72 BPM | SYSTOLIC BLOOD PRESSURE: 118 MMHG | WEIGHT: 160 LBS | DIASTOLIC BLOOD PRESSURE: 60 MMHG

## 2022-11-09 DIAGNOSIS — I95.1 ORTHOSTATIC HYPOTENSION: ICD-10-CM

## 2022-11-09 DIAGNOSIS — E78.5 HYPERLIPIDEMIA, UNSPECIFIED HYPERLIPIDEMIA TYPE: ICD-10-CM

## 2022-11-09 DIAGNOSIS — Z00.00 WELLNESS EXAMINATION: ICD-10-CM

## 2022-11-09 DIAGNOSIS — E53.8 VITAMIN B12 DEFICIENCY: Primary | ICD-10-CM

## 2022-11-09 DIAGNOSIS — G20.A1 PARKINSON'S DISEASE: ICD-10-CM

## 2022-11-09 DIAGNOSIS — Z80.0 FAMILY HISTORY OF COLON CANCER: ICD-10-CM

## 2022-11-09 DIAGNOSIS — I10 PRIMARY HYPERTENSION: ICD-10-CM

## 2022-11-09 PROCEDURE — G0439 PPPS, SUBSEQ VISIT: HCPCS | Mod: ,,, | Performed by: INTERNAL MEDICINE

## 2022-11-09 PROCEDURE — 36415 PR COLLECTION VENOUS BLOOD,VENIPUNCTURE: ICD-10-PCS | Mod: ,,, | Performed by: INTERNAL MEDICINE

## 2022-11-09 PROCEDURE — G0439 PR MEDICARE ANNUAL WELLNESS SUBSEQUENT VISIT: ICD-10-PCS | Mod: ,,, | Performed by: INTERNAL MEDICINE

## 2022-11-09 PROCEDURE — 36415 COLL VENOUS BLD VENIPUNCTURE: CPT | Mod: ,,, | Performed by: INTERNAL MEDICINE

## 2022-11-09 RX ORDER — TRAZODONE HYDROCHLORIDE 50 MG/1
50 TABLET ORAL NIGHTLY
COMMUNITY
Start: 2022-11-01 | End: 2022-11-09

## 2022-11-09 RX ORDER — ROPINIROLE 0.5 MG/1
0.5 TABLET, FILM COATED ORAL 2 TIMES DAILY
COMMUNITY
End: 2023-03-29

## 2022-11-09 RX ORDER — TAMSULOSIN HYDROCHLORIDE 0.4 MG/1
CAPSULE ORAL DAILY
COMMUNITY

## 2022-11-09 RX ORDER — PRAZOSIN HYDROCHLORIDE 2 MG/1
2-4 CAPSULE ORAL NIGHTLY PRN
COMMUNITY
Start: 2022-11-07 | End: 2022-11-09

## 2022-11-09 RX ORDER — KETOCONAZOLE 20 MG/ML
SHAMPOO, SUSPENSION TOPICAL
COMMUNITY
Start: 2022-10-28 | End: 2024-02-26

## 2022-11-09 RX ORDER — MIRTAZAPINE 30 MG/1
15-30 TABLET, FILM COATED ORAL NIGHTLY
COMMUNITY
Start: 2022-11-07 | End: 2022-11-09

## 2022-11-09 RX ORDER — ADALIMUMAB 40MG/0.8ML
40 KIT SUBCUTANEOUS
COMMUNITY
End: 2023-02-23

## 2022-11-09 NOTE — PROGRESS NOTES
Subjective:       Patient ID: Buzz Fontenot is a 71 y.o. male.      Patient Care Team:  Sean Moore II, MD as PCP - General (Internal Medicine)  Sean Moore II, MD    Chief Complaint: Medicare AWV Follow Up, Depression, Hypertension, Hyperlipidemia, and Anemia    71-year-old male here for followup of Parkinson's, psoriasis, BPH, GERD, hyperlipidemia, and hypertension among other conditions.     Review of Systems   Constitutional:  Negative for fever.   HENT:  Negative for nosebleeds.    Eyes:  Negative for visual disturbance.   Respiratory:  Negative for shortness of breath.    Cardiovascular:  Negative for chest pain.   Gastrointestinal:  Negative for abdominal pain.   Genitourinary:  Negative for dysuria.   Musculoskeletal:  Negative for gait problem.   Neurological:  Negative for headaches.         Patient Reported Health Risk Assessment  What [age] your age?: 65-69  Are you male or female?: Male  During the past four weeks, how much have you been bothered by emotional problems such as feeling anxious, depressed, irritable, sad, or downhearted and blue?: Slightly  During the past five weeks, has your physical and/or emotional health limited your social activities with family, friends, neighbors, or groups?: Slightly  During the past four weeks, how much bodily pain have you generally had?: Mild pain  During the past four weeks, was someone available to help if you needed and wanted help?: Yes, as much as I wanted  During the past four weeks, what was the hardest physical activity you could do for at least two minutes?: Very light  Can you get to places out of walking distance without help?  (For example, can you travel alone on buses or taxis, or drive your own car?): No  Can you go shopping for groceries or clothes without someone's help?: No  Can you prepare your own meals?: No  Can you do your own housework without help?: No  Because of any health problems, do you need the help of another person with  your personal care needs such as eating, bathing, dressing, or getting around the house?: Yes  Can you handle your own money without help?: No  During the past four weeks, how would you rate your health in general?: Good  How have things been going for you during the past four weeks?: Pretty well  Are you having difficulties driving your car?: Not applicable, I do not use a car  Do you always fasten your seat belt when you are in a car?: Yes, usually  How often in the past four weeks have you been bothered by falling or dizzy when standing up?: Never  How often in the past four weeks have you been bothered by sexual problems?: Never  How often in the past four weeks have you been bothered by trouble eating well?: Never  How often in the past four weeks have you been bothered by teeth or denture problems?: Never  How often in the past four weeks have you been bothered with problems using the telephone?: Never  How often in the past four weeks have you been bothered by tiredness or fatigue?: Never  Have you fallen two or more times in the past year?: Yes  Are you afraid of falling?: Yes  Are you a smoker?: No  During the past four weeks, how many drinks of wine, beer, or other alcoholic beverages did you have?: No alcohol at all  Do you exercise for about 20 minutes three or more days a week?: No, I usually do not exercise this much  Have you been given any information to help you with hazards in your house that might hurt you?: No  Have you been given any information to help you with keeping track of your medications?: No  How often do you have trouble taking medicines the way you've been told to take them?: I always take them as prescribed  How confident are you that you can control and manage most of your health problems?: Somewhat confident  What is your race? (Check all that apply.):       Objective:      Physical Exam  HENT:      Head: Normocephalic.      Mouth/Throat:      Pharynx: Oropharynx is clear.  "  Eyes:      Extraocular Movements: Extraocular movements intact.   Cardiovascular:      Rate and Rhythm: Normal rate and regular rhythm.   Pulmonary:      Breath sounds: Normal breath sounds.   Abdominal:      Palpations: Abdomen is soft.   Musculoskeletal:         General: No swelling.   Skin:     General: Skin is warm.   Neurological:      General: No focal deficit present.      Mental Status: He is alert and oriented to person, place, and time.   Psychiatric:         Mood and Affect: Mood normal.       Vitals:    11/09/22 1010   BP: 118/60   Pulse: 72   Resp: 16   Temp: 98.2 °F (36.8 °C)   SpO2: 98%   Weight: 72.6 kg (160 lb)   Height: 5' 7" (1.702 m)            No flowsheet data found.  Fall Risk Assessment - Outpatient 11/9/2022 8/12/2022   Mobility Status Wheelchair Bound Wheelchair Bound   Number of falls 0 2+   Identified as fall risk 1 1           Depression Screening  Over the past two weeks, has the patient felt down, depressed, or hopeless?: No  Over the past two weeks, has the patient felt little interest or pleasure in doing things?: No  Functional Ability/Safety Screening  Was the patient's timed Up & Go test unsteady or longer than 30 seconds?: Yes  Does the patient need help with phone, transportation, shopping, preparing meals, housework, laundry, meds, or managing money?: Yes  Does the patient's home have rugs in the hallway, lack grab bars in the bathroom, lack handrails on the stairs or have poor lighting?: No  Have you noticed any hearing difficulties?: No  Cognitive Function (Assessed through direct observation with due consideration of information obtained by way of patient reports and/or concerns raised by family, friends, caretakers, or others)    Does the patient repeat questions/statements in the same day?: No  Does the patient have trouble remembering the date, year, and time?: No  Does the patient have difficulty managing finances?: Yes  Does the patient have a decreased sense of " direction?: Yes      Assessment:       Problem List Items Addressed This Visit          Neuro    Parkinson's disease    Relevant Orders    CBC Auto Differential    Comprehensive Metabolic Panel    Lipid Panel    Urinalysis, Reflex to Urine Culture Urine, Clean Catch    TSH    Vitamin B12       Cardiac/Vascular    Hypertension    Relevant Orders    CBC Auto Differential    Comprehensive Metabolic Panel    Lipid Panel    Urinalysis, Reflex to Urine Culture Urine, Clean Catch    TSH    Vitamin B12    Hyperlipidemia    Relevant Orders    CBC Auto Differential    Comprehensive Metabolic Panel    Lipid Panel    Urinalysis, Reflex to Urine Culture Urine, Clean Catch    TSH    Vitamin B12    Orthostatic hypotension    Relevant Orders    CBC Auto Differential    Comprehensive Metabolic Panel    Lipid Panel    Urinalysis, Reflex to Urine Culture Urine, Clean Catch    TSH    Vitamin B12       Endocrine    Vitamin B12 deficiency - Primary    Relevant Orders    CBC Auto Differential    Comprehensive Metabolic Panel    Lipid Panel    Urinalysis, Reflex to Urine Culture Urine, Clean Catch    TSH    Vitamin B12       Other    Wellness examination    Relevant Orders    CBC Auto Differential    Comprehensive Metabolic Panel    Lipid Panel    Urinalysis, Reflex to Urine Culture Urine, Clean Catch    TSH    Vitamin B12         Medication List with Changes/Refills   Current Medications    ADALIMUMAB (HUMIRA) 40 MG/0.8 ML SYKT INJECTION    Inject 40 mg into the skin every 14 (fourteen) days.    CLONAZEPAM (KLONOPIN) 0.5 MG TABLET    Take 1 tablet (0.5 mg total) by mouth 2 (two) times daily.    KETOCONAZOLE (NIZORAL) 2 % SHAMPOO    Apply topically.    PANTOPRAZOLE (PROTONIX) 40 MG TABLET    Take 1 tablet (40 mg total) by mouth once daily.    QUETIAPINE (SEROQUEL) 50 MG TABLET    Take 1 tablet (50 mg total) by mouth nightly.    ROPINIROLE (REQUIP) 0.5 MG TABLET    Take 1 tablet (0.5 mg total) by mouth 2 (two) times a day.    ROPINIROLE  "(REQUIP) 0.5 MG TABLET    Take 0.5 mg by mouth 2 (two) times a day.    TAMSULOSIN (FLOMAX) 0.4 MG CAP    Take by mouth once daily.   Discontinued Medications    CARBIDOPA-LEVODOPA (RYTARY) 36. MG CPSR    Take 2 capsules by mouth 5 (five) times daily.    MIRTAZAPINE (REMERON) 15 MG TABLET    Take 1 tablet (15 mg total) by mouth every evening.    MIRTAZAPINE (REMERON) 30 MG TABLET    Take 15-30 mg by mouth every evening.    PRAZOSIN (MINIPRESS) 2 MG CAP    Take 2-4 mg by mouth nightly as needed.    SERTRALINE (ZOLOFT) 50 MG TABLET    Take 1 tablet (50 mg total) by mouth once daily.    TAMSULOSIN (FLOMAX) 0.4 MG CAP    Take 1 capsule (0.4 mg total) by mouth once daily.    TRAZODONE (DESYREL) 50 MG TABLET    Take 50 mg by mouth every evening.        Plan:       1. Psoriasis: He was followed by Dr. Kam. Back on Humira     2. Mild coronary artery disease: Angiogram in 2013 showed "less than 30% lesion." No longer on aspirin, was followed by Dr. Leal     3. Benign prostatic hypertrophy: He is followed by Dr. Anglin. Flomax was discontinued because of syncope in 2018, now back on Flomax. He is incontinent.      4. Hypogonadism: He is no longer on testosterone replacement     5. Insomnia: On Seroquel and clonazepam     6. Hyperlipidemia: LDL at goal     7. Hypertension: Amlodipine was discontinued because of hypotension.     8. Parkinson's disease: Diagnosed in 2017. Multiple medications were discontinued while in the hospital in 2018, including Wellbutrin, Exelon patch, Flomax, midodrine. Followed by Dr. Becerra. Sinemet was stopped when he started hospice a few months ago, and they think he is doing better off of this medication. Refer to PT     9.  Anxiety and depression: He was referred to Dr. Espana in 2019. He was started on desvenlafaxine with good results.  He is on clonazepam daily, no longer on Pristiq     10. Hallucinations: Improved, Dr. Becerra increased Seroquel     11.  Vitamin B12 deficiency: No " longer on supplement     12. Wellness: Colonoscopy was in January 2017 with history of polyps with Dr. Gould     He was on hospice until 7/2022 when they revoked in order to pursue more aggressive care for Parkinsons.     [facility] Annual Wellness and Personalized Prevention Plan:   Fall Risk + Home Safety + Hearing Impairment + Depression Screen + Cognitive Impairment Screen + Health Risk Assessment all reviewed    Health Maintenance Topics with due status: Not Due       Topic Last Completion Date    TETANUS VACCINE 04/15/2020    Lipid Panel 11/26/2021      The patient's Health Maintenance was reviewed and the following appears to be due at this time:   Health Maintenance Due   Topic Date Due    Hepatitis C Screening  Never done    Shingles Vaccine (1 of 2) Never done    Pneumococcal Vaccines (Age 65+) (1 - PCV) Never done    Abdominal Aortic Aneurysm Screening  Never done    COVID-19 Vaccine (3 - Booster for Moderna series) 05/18/2021    Colorectal Cancer Screening  12/23/2021    Influenza Vaccine (1) 09/01/2022       Advance Care Planning   I attest to discussing Advance Care Planning with patient and/or family member.  Education was provided including the importance of the Health Care Power of , Advance Directives, and/or [place] documentation.  The patient expressed understanding to the importance of this information and discussion.  Length of ACP conversation in minutes: 2       Opioid Screening: Patient medication list reviewed, patient is not taking prescription opioids. Patient is not using additional opioids than prescribed. Patient is at low risk of substance abuse based on this opioid use history.     No follow-ups on file. In addition to their scheduled follow up, the patient has also been instructed to follow up on as needed basis.

## 2022-11-11 ENCOUNTER — TELEPHONE (OUTPATIENT)
Dept: INTERNAL MEDICINE | Facility: CLINIC | Age: 72
End: 2022-11-11
Payer: MEDICARE

## 2022-11-11 NOTE — TELEPHONE ENCOUNTER
----- Message from Sean Moore II, MD sent at 11/10/2022  7:41 AM CST -----  Please call him with labs that look fine.  Cholesterol is elevated, and I would recommend that he start rosuvastatin 20mg daily because of his CAD that was seen years ago

## 2022-11-21 ENCOUNTER — TELEPHONE (OUTPATIENT)
Dept: INTERNAL MEDICINE | Facility: CLINIC | Age: 72
End: 2022-11-21
Payer: MEDICARE

## 2022-11-21 DIAGNOSIS — E78.5 HYPERLIPIDEMIA, UNSPECIFIED HYPERLIPIDEMIA TYPE: Primary | ICD-10-CM

## 2022-11-21 RX ORDER — ROSUVASTATIN CALCIUM 20 MG/1
20 TABLET, COATED ORAL DAILY
Qty: 90 TABLET | Refills: 3 | Status: SHIPPED | OUTPATIENT
Start: 2022-11-21 | End: 2023-11-13

## 2022-11-21 NOTE — TELEPHONE ENCOUNTER
Explained to patient wife that I was waiting on a call back from her to make sure they were ok with the statin.

## 2022-11-21 NOTE — TELEPHONE ENCOUNTER
----- Message from Reny Savage, Patient Care Assistant sent at 11/21/2022 10:07 AM CST -----  Regarding: med question  Pt saw dr marmolejo and his cholesterol was up and he was told they would be prescribing him some chrestor and the pharmacy told the pt they never received this med. Im not seeing anything other than a previous message asking for a call to discuss labs.    Phone: [phone] Latha Fontenot

## 2022-12-06 ENCOUNTER — TELEPHONE (OUTPATIENT)
Dept: INTERNAL MEDICINE | Facility: CLINIC | Age: 72
End: 2022-12-06
Payer: MEDICARE

## 2022-12-06 DIAGNOSIS — R39.89 URINE TROUBLES: Primary | ICD-10-CM

## 2022-12-06 RX ORDER — CIPROFLOXACIN 500 MG/1
500 TABLET ORAL 2 TIMES DAILY
Qty: 14 TABLET | Refills: 0 | Status: SHIPPED | OUTPATIENT
Start: 2022-12-06 | End: 2022-12-13

## 2022-12-06 NOTE — TELEPHONE ENCOUNTER
----- Message from Afshan Hong sent at 12/6/2022  2:40 PM CST -----  Regarding: uti  Buzz is showing signs of confusion, he does have parkinson, also smell/odor from urination potential UTI?? Can Med be prescribed?  Please call wife  [facility] pharmacy  [phone]

## 2022-12-17 RX ORDER — CIPROFLOXACIN 500 MG/1
500 TABLET ORAL 2 TIMES DAILY
Qty: 14 TABLET | Refills: 0 | Status: SHIPPED | OUTPATIENT
Start: 2022-12-17 | End: 2022-12-24

## 2022-12-20 ENCOUNTER — TELEPHONE (OUTPATIENT)
Dept: INTERNAL MEDICINE | Facility: CLINIC | Age: 72
End: 2022-12-20
Payer: MEDICARE

## 2022-12-20 DIAGNOSIS — R39.89 URINE TROUBLES: Primary | ICD-10-CM

## 2022-12-20 NOTE — TELEPHONE ENCOUNTER
----- Message from Reny Savage, Patient Care Assistant sent at 12/20/2022  1:51 PM CST -----  Regarding: UA Order  Will you please add a UA for the pt? He is having general weakness and a cough and thinks he might have a UTI and is req for a UA that they will collect in the morning to drop off at [facility] to hopefully have the results back by the roshan tomorrow at 2:30.

## 2022-12-21 ENCOUNTER — OFFICE VISIT (OUTPATIENT)
Dept: INTERNAL MEDICINE | Facility: CLINIC | Age: 72
End: 2022-12-21
Payer: MEDICARE

## 2022-12-21 VITALS
HEIGHT: 67 IN | HEART RATE: 72 BPM | RESPIRATION RATE: 14 BRPM | OXYGEN SATURATION: 96 % | DIASTOLIC BLOOD PRESSURE: 78 MMHG | TEMPERATURE: 98 F | BODY MASS INDEX: 25.11 KG/M2 | SYSTOLIC BLOOD PRESSURE: 124 MMHG | WEIGHT: 160 LBS

## 2022-12-21 DIAGNOSIS — I10 PRIMARY HYPERTENSION: ICD-10-CM

## 2022-12-21 DIAGNOSIS — G20.A1 PARKINSON'S DISEASE: ICD-10-CM

## 2022-12-21 DIAGNOSIS — D64.9 CHRONIC ANEMIA: ICD-10-CM

## 2022-12-21 DIAGNOSIS — Z00.00 WELLNESS EXAMINATION: ICD-10-CM

## 2022-12-21 DIAGNOSIS — E53.8 VITAMIN B12 DEFICIENCY: Primary | ICD-10-CM

## 2022-12-21 DIAGNOSIS — E78.5 HYPERLIPIDEMIA, UNSPECIFIED HYPERLIPIDEMIA TYPE: ICD-10-CM

## 2022-12-21 LAB
APPEARANCE UR: CLEAR
BACTERIA #/AREA URNS AUTO: ABNORMAL /HPF
BILIRUB UR QL STRIP.AUTO: NEGATIVE MG/DL
COLOR UR AUTO: ABNORMAL
GLUCOSE UR QL STRIP.AUTO: NEGATIVE MG/DL
KETONES UR QL STRIP.AUTO: ABNORMAL MG/DL
LEUKOCYTE ESTERASE UR QL STRIP.AUTO: NEGATIVE UNIT/L
NITRITE UR QL STRIP.AUTO: NEGATIVE
PH UR STRIP.AUTO: 6 [PH]
PROT UR QL STRIP.AUTO: ABNORMAL MG/DL
RBC #/AREA URNS AUTO: <5 /HPF
RBC UR QL AUTO: NEGATIVE UNIT/L
SP GR UR STRIP.AUTO: 1.03 (ref 1–1.03)
SQUAMOUS #/AREA URNS AUTO: <5 /HPF
UROBILINOGEN UR STRIP-ACNC: 1 MG/DL
WBC #/AREA URNS AUTO: 7 /HPF

## 2022-12-21 PROCEDURE — 99214 PR OFFICE/OUTPT VISIT, EST, LEVL IV, 30-39 MIN: ICD-10-PCS | Mod: ,,, | Performed by: INTERNAL MEDICINE

## 2022-12-21 PROCEDURE — 99214 OFFICE O/P EST MOD 30 MIN: CPT | Mod: ,,, | Performed by: INTERNAL MEDICINE

## 2022-12-21 RX ORDER — TRAZODONE HYDROCHLORIDE 50 MG/1
50 TABLET ORAL NIGHTLY
COMMUNITY
Start: 2022-12-12 | End: 2023-01-06

## 2022-12-21 RX ORDER — PRAZOSIN HYDROCHLORIDE 2 MG/1
2-4 CAPSULE ORAL NIGHTLY PRN
COMMUNITY
Start: 2022-12-12 | End: 2023-02-23

## 2022-12-21 RX ORDER — BENZONATATE 100 MG/1
100 CAPSULE ORAL 3 TIMES DAILY PRN
Qty: 30 CAPSULE | Refills: 0 | Status: SHIPPED | OUTPATIENT
Start: 2022-12-21 | End: 2022-12-31

## 2022-12-21 RX ORDER — MIRTAZAPINE 30 MG/1
15-30 TABLET, FILM COATED ORAL NIGHTLY
COMMUNITY
Start: 2022-12-05 | End: 2023-01-06

## 2022-12-21 NOTE — PROGRESS NOTES
"Subjective:       Patient ID: Buzz Fontenot is a 71 y.o. male.      Patient Care Team:  Sean Moore II, MD as PCP - General (Internal Medicine)  Sean Moore II, MD    Chief Complaint: Cough      71-year-old male here for followup of Parkinson's, psoriasis, BPH, GERD, hyperlipidemia, and hypertension among other conditions.  He reports cough and congestion over the past 3 weeks.  He thinks he is declining overall with Parkinson's disease    Review of Systems   Constitutional:  Negative for fever.   HENT:  Negative for nosebleeds.    Eyes:  Negative for visual disturbance.   Respiratory:  Negative for shortness of breath.    Cardiovascular:  Negative for chest pain.   Gastrointestinal:  Negative for abdominal pain.   Genitourinary:  Negative for dysuria.   Musculoskeletal:  Negative for gait problem.   Neurological:  Negative for headaches.         Patient Reported Health Risk Assessment         Objective:      Physical Exam  HENT:      Head: Normocephalic.      Mouth/Throat:      Pharynx: Oropharynx is clear.   Eyes:      Extraocular Movements: Extraocular movements intact.   Cardiovascular:      Rate and Rhythm: Normal rate and regular rhythm.   Pulmonary:      Breath sounds: Normal breath sounds.   Abdominal:      Palpations: Abdomen is soft.   Musculoskeletal:         General: No swelling.   Skin:     General: Skin is warm.   Neurological:      General: No focal deficit present.      Mental Status: He is alert and oriented to person, place, and time.   Psychiatric:         Mood and Affect: Mood normal.       Vitals:    12/21/22 1432   BP: 124/78   Pulse: 72   Resp: 14   Temp: 98.1 °F (36.7 °C)   SpO2: 96%   Weight: 72.6 kg (160 lb)   Height: 5' 7" (1.702 m)            No flowsheet data found.  Fall Risk Assessment - Outpatient 12/21/2022 11/9/2022 8/12/2022   Mobility Status Ambulatory Wheelchair Bound Wheelchair Bound   Number of falls 0 0 2+   Identified as fall risk 0 1 1                  Assessment: " "      Problem List Items Addressed This Visit          Neuro    Parkinson's disease       Cardiac/Vascular    Hypertension    Hyperlipidemia       Oncology    Chronic anemia       Endocrine    Vitamin B12 deficiency - Primary       Other    Wellness examination         Medication List with Changes/Refills   New Medications    BENZONATATE (TESSALON) 100 MG CAPSULE    Take 1 capsule (100 mg total) by mouth 3 (three) times daily as needed for Cough.   Current Medications    ADALIMUMAB (HUMIRA) 40 MG/0.8 ML SYKT INJECTION    Inject 40 mg into the skin every 14 (fourteen) days.    CIPROFLOXACIN HCL (CIPRO) 500 MG TABLET    Take 1 tablet (500 mg total) by mouth 2 (two) times daily. for 7 days    CLONAZEPAM (KLONOPIN) 0.5 MG TABLET    Take 1 tablet (0.5 mg total) by mouth 2 (two) times daily.    KETOCONAZOLE (NIZORAL) 2 % SHAMPOO    Apply topically.    MIRTAZAPINE (REMERON) 30 MG TABLET    Take 15-30 mg by mouth every evening.    PANTOPRAZOLE (PROTONIX) 40 MG TABLET    Take 1 tablet (40 mg total) by mouth once daily.    PRAZOSIN (MINIPRESS) 2 MG CAP    Take 2-4 mg by mouth nightly as needed.    QUETIAPINE (SEROQUEL) 50 MG TABLET    Take 1 tablet (50 mg total) by mouth nightly.    ROPINIROLE (REQUIP) 0.5 MG TABLET    Take 0.5 mg by mouth 2 (two) times a day.    ROSUVASTATIN (CRESTOR) 20 MG TABLET    Take 1 tablet (20 mg total) by mouth once daily.    TAMSULOSIN (FLOMAX) 0.4 MG CAP    Take by mouth once daily.    TRAZODONE (DESYREL) 50 MG TABLET    Take 50 mg by mouth every evening.        Plan:       1. Psoriasis: He was followed by Dr. Kam. Back on Humira     2. Mild coronary artery disease: Angiogram in 2013 showed "less than 30% lesion." No longer on aspirin, was followed by Dr. Leal     3. Benign prostatic hypertrophy: He is followed by Dr. Anglin. Flomax was discontinued because of syncope in 2018, now back on Flomax. He is incontinent.      4. Hypogonadism: He is no longer on testosterone replacement     5. " Insomnia: On Seroquel and clonazepam     6. Hyperlipidemia: LDL at goal     7. Hypertension: Amlodipine was discontinued because of hypotension.     8. Parkinson's disease: Diagnosed in 2017. Multiple medications were discontinued while in the hospital in 2018, including Wellbutrin, Exelon patch, Flomax, midodrine. Followed by Dr. Becerra. Sinemet was stopped when he started hospice a few months ago, and they think he is doing better off of this medication. Continue PT at [facility]     9.  Anxiety and depression: He was referred to Dr. Espana in 2019. He was started on desvenlafaxine with good results.  He is on clonazepam daily, no longer on Pristiq     10. Hallucinations: Improved, Dr. Becerra increased Seroquel     11.  Vitamin B12 deficiency: No longer on supplement     12. Bronchitis:  Symptoms for over the past 3 weeks.  He is already on antibiotics for urinary issues.  Start Tessalon Perles    13. Wellness: Colonoscopy was in January 2017 with history of polyps with Dr. Gould     He was on hospice until 7/2022 when they revoked in order to pursue more aggressive care for Parkinsons.     [facility] and Personalized Prevention Plan:   Fall Risk + Home Safety + Hearing Impairment + Depression Screen + Cognitive Impairment Screen + Health Risk Assessment all reviewed    Health Maintenance Topics with due status: Not Due       Topic Last Completion Date    TETANUS VACCINE 04/15/2020    Lipid Panel 11/09/2022      The patient's Health Maintenance was reviewed and the following appears to be due at this time:   Health Maintenance Due   Topic Date Due    Hepatitis C Screening  Never done    Shingles Vaccine (1 of 2) Never done    Pneumococcal Vaccines (Age 65+) (1 - PCV) Never done    Abdominal Aortic Aneurysm Screening  Never done    COVID-19 Vaccine (3 - Booster for Moderna series) 05/18/2021    Colorectal Cancer Screening  12/23/2021    Influenza Vaccine (1) 09/01/2022        Advance Care Planning   I attest to discussing Advance Care Planning with patient and/or family member.  Education was provided including the importance of the Health Care Power of , Advance Directives, and/or [place] documentation.  The patient expressed understanding to the importance of this information and discussion.  Length of ACP conversation in minutes: 2       Opioid Screening: Patient medication list reviewed, patient is not taking prescription opioids. Patient is not using additional opioids than prescribed. Patient is at low risk of substance abuse based on this opioid use history.     No follow-ups on file. In addition to their scheduled follow up, the patient has also been instructed to follow up on as needed basis.

## 2022-12-28 ENCOUNTER — PATIENT OUTREACH (OUTPATIENT)
Dept: ADMINISTRATIVE | Facility: HOSPITAL | Age: 72
End: 2022-12-28
Payer: MEDICARE

## 2022-12-29 DIAGNOSIS — S32.692A: Primary | ICD-10-CM

## 2022-12-30 RX ORDER — HYDROCODONE BITARTRATE AND ACETAMINOPHEN 10; 325 MG/1; MG/1
1 TABLET ORAL 3 TIMES DAILY
Qty: 21 TABLET | Refills: 0 | Status: SHIPPED | OUTPATIENT
Start: 2022-12-30 | End: 2023-02-23

## 2022-12-30 NOTE — TELEPHONE ENCOUNTER
Patients wife advised Dr. Dominguez will call out Norco 10mg until they are seen    ----- Message from Bethanie Nazario sent at 12/30/2022 10:50 AM CST -----  Went to orthopedic urgent care on [facility] before yesterday for a possible pelvic fracture  Gave him 12 pain pills of Norco 5mg, one every 4 hours and he only has 3 left   Can he have a refill please pain is at a level 9  Scheduled for a CT this morning at [facility], he will not be able to make it, he is in too much pain. Mrs Fontenot is calling to reschedule.  Uses [facility]     Has a follow up with Dr Thomas at [facility] on 01/09/23    [phone], she's crying on the phone, really needs help

## 2023-01-03 ENCOUNTER — TELEPHONE (OUTPATIENT)
Dept: INTERNAL MEDICINE | Facility: CLINIC | Age: 73
End: 2023-01-03
Payer: MEDICARE

## 2023-01-03 NOTE — TELEPHONE ENCOUNTER
----- Message from Reny Savage, Patient Care Assistant sent at 1/3/2023 11:50 AM CST -----  Regarding: return call  Latha called about Mr Gerber said he had a vagal maneuver yesterday and a 3 minute seizure is req a call back please!    Phone #: [phone]

## 2023-01-05 ENCOUNTER — TELEPHONE (OUTPATIENT)
Dept: INTERNAL MEDICINE | Facility: CLINIC | Age: 73
End: 2023-01-05

## 2023-01-05 ENCOUNTER — HOSPITAL ENCOUNTER (EMERGENCY)
Facility: HOSPITAL | Age: 73
Discharge: HOME | End: 2023-01-05
Attending: STUDENT IN AN ORGANIZED HEALTH CARE EDUCATION/TRAINING PROGRAM
Payer: MEDICARE

## 2023-01-05 VITALS
DIASTOLIC BLOOD PRESSURE: 94 MMHG | HEART RATE: 77 BPM | HEIGHT: 68 IN | WEIGHT: 170 LBS | BODY MASS INDEX: 25.76 KG/M2 | TEMPERATURE: 99 F | SYSTOLIC BLOOD PRESSURE: 149 MMHG | OXYGEN SATURATION: 94 % | RESPIRATION RATE: 19 BRPM

## 2023-01-05 DIAGNOSIS — R53.1 GENERALIZED WEAKNESS: ICD-10-CM

## 2023-01-05 DIAGNOSIS — R53.1 WEAKNESS: ICD-10-CM

## 2023-01-05 DIAGNOSIS — G20.A1 PARKINSON DISEASE: Primary | ICD-10-CM

## 2023-01-05 LAB
ALBUMIN SERPL-MCNC: 3.9 G/DL (ref 3.4–4.8)
ALBUMIN/GLOB SERPL: 1.1 RATIO (ref 1.1–2)
ALP SERPL-CCNC: 47 UNIT/L (ref 40–150)
ALT SERPL-CCNC: 29 UNIT/L (ref 0–55)
AMPHET UR QL SCN: NEGATIVE
APPEARANCE UR: CLEAR
AST SERPL-CCNC: 25 UNIT/L (ref 5–34)
BACTERIA #/AREA URNS AUTO: NORMAL /HPF
BARBITURATE SCN PRESENT UR: NEGATIVE
BASOPHILS # BLD AUTO: 0.1 X10(3)/MCL (ref 0–0.2)
BASOPHILS NFR BLD AUTO: 1.5 %
BENZODIAZ UR QL SCN: POSITIVE
BILIRUB UR QL STRIP.AUTO: NEGATIVE MG/DL
BILIRUBIN DIRECT+TOT PNL SERPL-MCNC: 0.5 MG/DL
BNP BLD-MCNC: 94.9 PG/ML
BUN SERPL-MCNC: 12.8 MG/DL (ref 8.4–25.7)
CALCIUM SERPL-MCNC: 9.6 MG/DL (ref 8.8–10)
CANNABINOIDS UR QL SCN: NEGATIVE
CHLORIDE SERPL-SCNC: 106 MMOL/L (ref 98–107)
CO2 SERPL-SCNC: 25 MMOL/L (ref 23–31)
COCAINE UR QL SCN: NEGATIVE
COLOR UR AUTO: ABNORMAL
CREAT SERPL-MCNC: 1.24 MG/DL (ref 0.73–1.18)
EOSINOPHIL # BLD AUTO: 0.15 X10(3)/MCL (ref 0–0.9)
EOSINOPHIL NFR BLD AUTO: 2.2 %
ERYTHROCYTE [DISTWIDTH] IN BLOOD BY AUTOMATED COUNT: 15 % (ref 11.6–14.4)
FENTANYL UR QL SCN: POSITIVE
FLUAV AG UPPER RESP QL IA.RAPID: NOT DETECTED
FLUBV AG UPPER RESP QL IA.RAPID: NOT DETECTED
GFR SERPLBLD CREATININE-BSD FMLA CKD-EPI: 63 MLS/MIN/1.73/M2
GLOBULIN SER-MCNC: 3.5 GM/DL (ref 2.4–3.5)
GLUCOSE SERPL-MCNC: 101 MG/DL (ref 82–115)
GLUCOSE UR QL STRIP.AUTO: NEGATIVE MG/DL
HCT VFR BLD AUTO: 41.2 % (ref 42–52)
HGB BLD-MCNC: 12.9 GM/DL (ref 14–18)
IMM GRANULOCYTES # BLD AUTO: 0.03 X10(3)/MCL (ref 0–0.04)
IMM GRANULOCYTES NFR BLD AUTO: 0.4 %
KETONES UR QL STRIP.AUTO: ABNORMAL MG/DL
LEUKOCYTE ESTERASE UR QL STRIP.AUTO: NEGATIVE UNIT/L
LYMPHOCYTES # BLD AUTO: 1.32 X10(3)/MCL (ref 0.6–4.6)
LYMPHOCYTES NFR BLD AUTO: 19.6 %
MAGNESIUM SERPL-MCNC: 2.2 MG/DL (ref 1.6–2.6)
MCH RBC QN AUTO: 27 PG
MCHC RBC AUTO-ENTMCNC: 31.3 MG/DL (ref 33–36)
MCV RBC AUTO: 86.2 FL (ref 80–94)
MDMA UR QL SCN: NEGATIVE
MONOCYTES # BLD AUTO: 1 X10(3)/MCL (ref 0.1–1.3)
MONOCYTES NFR BLD AUTO: 14.8 %
NEUTROPHILS # BLD AUTO: 4.14 X10(3)/MCL (ref 2.1–9.2)
NEUTROPHILS NFR BLD AUTO: 61.5 %
NITRITE UR QL STRIP.AUTO: NEGATIVE
NRBC BLD AUTO-RTO: 0 % (ref 0–1)
OPIATES UR QL SCN: POSITIVE
PCP UR QL: NEGATIVE
PH UR STRIP.AUTO: 5.5 [PH]
PH UR: 5.5 [PH] (ref 3–11)
PLATELET # BLD AUTO: 211 X10(3)/MCL (ref 140–371)
PMV BLD AUTO: 10.6 FL (ref 9.4–12.4)
POTASSIUM SERPL-SCNC: 4.5 MMOL/L (ref 3.5–5.1)
PROT SERPL-MCNC: 7.4 GM/DL (ref 5.8–7.6)
PROT UR QL STRIP.AUTO: ABNORMAL MG/DL
RBC # BLD AUTO: 4.78 X10(6)/MCL (ref 4.7–6.1)
RBC #/AREA URNS AUTO: <5 /HPF
RBC UR QL AUTO: NEGATIVE UNIT/L
SARS-COV-2 RNA RESP QL NAA+PROBE: NOT DETECTED
SODIUM SERPL-SCNC: 136 MMOL/L (ref 136–145)
SP GR UR STRIP.AUTO: 1.02 (ref 1–1.03)
SPECIFIC GRAVITY, URINE AUTO (.000) (OHS): 1.02 (ref 1–1.03)
SQUAMOUS #/AREA URNS AUTO: <5 /HPF
TROPONIN I SERPL-MCNC: <0.01 NG/ML (ref 0–0.04)
UROBILINOGEN UR STRIP-ACNC: 2 MG/DL
WBC # SPEC AUTO: 6.7 X10(3)/MCL (ref 4.5–11.5)
WBC #/AREA URNS AUTO: <5 /HPF

## 2023-01-05 PROCEDURE — 83735 ASSAY OF MAGNESIUM: CPT | Performed by: PHYSICIAN ASSISTANT

## 2023-01-05 PROCEDURE — 85025 COMPLETE CBC W/AUTO DIFF WBC: CPT | Performed by: PHYSICIAN ASSISTANT

## 2023-01-05 PROCEDURE — 0240U COVID/FLU A&B PCR: CPT | Performed by: PHYSICIAN ASSISTANT

## 2023-01-05 PROCEDURE — 93005 ELECTROCARDIOGRAM TRACING: CPT

## 2023-01-05 PROCEDURE — 63600175 PHARM REV CODE 636 W HCPCS: Performed by: STUDENT IN AN ORGANIZED HEALTH CARE EDUCATION/TRAINING PROGRAM

## 2023-01-05 PROCEDURE — 99285 EMERGENCY DEPT VISIT HI MDM: CPT | Mod: 25,CS

## 2023-01-05 PROCEDURE — 96360 HYDRATION IV INFUSION INIT: CPT

## 2023-01-05 PROCEDURE — 80053 COMPREHEN METABOLIC PANEL: CPT | Performed by: PHYSICIAN ASSISTANT

## 2023-01-05 PROCEDURE — 25500020 PHARM REV CODE 255: Performed by: STUDENT IN AN ORGANIZED HEALTH CARE EDUCATION/TRAINING PROGRAM

## 2023-01-05 PROCEDURE — 84484 ASSAY OF TROPONIN QUANT: CPT | Performed by: PHYSICIAN ASSISTANT

## 2023-01-05 PROCEDURE — 83880 ASSAY OF NATRIURETIC PEPTIDE: CPT | Performed by: PHYSICIAN ASSISTANT

## 2023-01-05 PROCEDURE — 80307 DRUG TEST PRSMV CHEM ANLYZR: CPT | Performed by: STUDENT IN AN ORGANIZED HEALTH CARE EDUCATION/TRAINING PROGRAM

## 2023-01-05 PROCEDURE — 81001 URINALYSIS AUTO W/SCOPE: CPT | Mod: 59 | Performed by: PHYSICIAN ASSISTANT

## 2023-01-05 PROCEDURE — 93010 ELECTROCARDIOGRAM REPORT: CPT | Mod: ,,, | Performed by: INTERNAL MEDICINE

## 2023-01-05 PROCEDURE — 93010 EKG 12-LEAD: ICD-10-PCS | Mod: ,,, | Performed by: INTERNAL MEDICINE

## 2023-01-05 RX ADMIN — IOPAMIDOL 100 ML: 755 INJECTION, SOLUTION INTRAVENOUS at 04:01

## 2023-01-05 RX ADMIN — SODIUM CHLORIDE, POTASSIUM CHLORIDE, SODIUM LACTATE AND CALCIUM CHLORIDE 1000 ML: 600; 310; 30; 20 INJECTION, SOLUTION INTRAVENOUS at 03:01

## 2023-01-05 NOTE — ED NOTES
Per family, pt has had increased confusion, weakness over past week; also states that he had a seizure 4 days ago; also states that she had to disimpact his stool yesterday; also states that he may have a pelvic fx

## 2023-01-05 NOTE — ED PROVIDER NOTES
Encounter Date: 1/5/2023    SCRIBE #1 NOTE: I, Susan Cartagena, am scribing for, and in the presence of,  Phil Starr MD. I have scribed the following portions of the note - Other sections scribed: HPI, ROS, PE.     History     Chief Complaint   Patient presents with    Fatigue     EMS reports generalized weakness and increasing confusion since yesterday. Pmh Parkinsons. GCS 14-baseline.      72 year old male with a history of Parkinson's presents to ED, via EMS, with his wife, complaining of weakness. Pt's wife, at bedside, reports that the pt has been weaker than usual over the past few days.  He was constipated and had seizure like activity that lasted about 3 minutes after he was trying to use the restroom.  Pt was also having some pain in his hips and buttocks, and urgent care said he may have a pelvic fracture.  Pt's wife denies any fevers or recent falls.    The history is provided by the spouse. The history is limited by the condition of the patient.   Fatigue  This is a new problem. The current episode started yesterday. The problem occurs constantly. The problem has not changed since onset.  Review of patient's allergies indicates:   Allergen Reactions    Penicillins Rash     No past medical history on file.  Past Surgical History:   Procedure Laterality Date    CATARACT EXTRACTION Left     CERVICAL FUSION      INGUINAL HERNIA REPAIR      TONSILLECTOMY      VITRECTOMY       Family History   Problem Relation Age of Onset    Heart disease Father     Heart attack Father     Heart disease Sister     Heart disease Brother      Social History     Tobacco Use    Smoking status: Former   Substance Use Topics    Alcohol use: Not Currently    Drug use: Never     Review of Systems   Unable to perform ROS: Dementia   Constitutional:  Positive for fatigue.     Physical Exam     Initial Vitals [01/05/23 1231]   BP Pulse Resp Temp SpO2   (!) 142/90 85 16 98.8 °F (37.1 °C) 99 %      MAP       --         Physical  Exam    Nursing note and vitals reviewed.  Constitutional: He appears well-developed. He is not diaphoretic. No distress.   At his baseline of 14 GCS   HENT:   Head: Normocephalic and atraumatic.   Nose: Nose normal.   Mouth/Throat: Oropharynx is clear and moist.   Eyes: Conjunctivae and EOM are normal. Pupils are equal, round, and reactive to light.   Neck: Neck supple.   Normal range of motion.  Cardiovascular:  Normal rate and regular rhythm.           No murmur heard.  Pulmonary/Chest: Breath sounds normal. No respiratory distress. He has no wheezes. He has no rales.   Abdominal: Abdomen is soft. He exhibits no distension. There is no abdominal tenderness.   Musculoskeletal:         General: No edema. Normal range of motion.      Cervical back: Normal range of motion and neck supple.     Neurological: No cranial nerve deficit.   Tremulous, which is his baseline   Skin: Skin is warm. Capillary refill takes less than 2 seconds. No rash noted.   No outward signs of obvious trauma       ED Course   Procedures  Labs Reviewed   COMPREHENSIVE METABOLIC PANEL - Abnormal; Notable for the following components:       Result Value    Creatinine 1.24 (*)     All other components within normal limits   URINALYSIS, REFLEX TO URINE CULTURE - Abnormal; Notable for the following components:    Protein, UA Trace (*)     Ketones, UA Trace (*)     Urobilinogen, UA 2.0 (*)     All other components within normal limits   CBC WITH DIFFERENTIAL - Abnormal; Notable for the following components:    Hgb 12.9 (*)     Hct 41.2 (*)     MCHC 31.3 (*)     RDW 15.0 (*)     All other components within normal limits   DRUG SCREEN, URINE (BEAKER) - Abnormal; Notable for the following components:    Benzodiazepine, Urine Positive (*)     Fentanyl, Urine Positive (*)     Opiates, Urine Positive (*)     All other components within normal limits    Narrative:     Cut off concentrations:    Amphetamines - 1000 ng/ml  Barbiturates - 200  ng/ml  Benzodiazepine - 200 ng/ml  Cannabinoids (THC) - 50 ng/ml  Cocaine - 300 ng/ml  Fentanyl - 1.0 ng/ml  MDMA - 500 ng/ml  Opiates - 300 ng/ml   Phencyclidine (PCP) - 25 ng/ml    Specimen submitted for drug analysis and tested for pH and specific gravity in order to evaluate sample integrity. Suspect tampering if specific gravity is <1.003 and/or pH is not within the range of 4.5 - 8.0  False negatives may result form substances such as bleach added to urine.  False positives may result for the presence of a substance with similar chemical structure to the drug or its metabolite.    This test provides only a PRELIMINARY analytical test result. A more specific alternate chemical method must be used in order to obtain a confirmed analytical result. Gas chromatography/mass spectrometry (GC/MS) is the preferred confirmatory method. Other chemical confirmation methods are available. Clinical consideration and professional judgement should be applied to any drug of abuse test result, particularly when preliminary positive results are used.    Positive results will be confirmed only at the physicians request. Unconfirmed screening results are to be used only for medical purposes (treatment).        TROPONIN I - Normal   MAGNESIUM - Normal   B-TYPE NATRIURETIC PEPTIDE - Normal   COVID/FLU A&B PCR - Normal    Narrative:     The Xpert Xpress SARS-CoV-2/FLU/RSV plus is a rapid, multiplexed real-time PCR test intended for the simultaneous qualitative detection and differentiation of SARS-CoV-2, Influenza A, Influenza B, and respiratory syncytial virus (RSV) viral RNA in either nasopharyngeal swab or nasal swab specimens.         URINALYSIS, MICROSCOPIC - Normal   CBC W/ AUTO DIFFERENTIAL    Narrative:     The following orders were created for panel order CBC auto differential.  Procedure                               Abnormality         Status                     ---------                               -----------          ------                     CBC with Differential[[id number]]        Abnormal            Final result                 Please view results for these tests on the individual orders.        ECG Results              EKG 12-lead (Final result)  Result time 01/06/23 08:09:15      Final result by Interface, Lab In [facility] (01/06/23 08:09:15)                   Narrative:    Test Reason : R53.1,    Vent. Rate : 087 BPM     Atrial Rate : 087 BPM     P-R Int : 134 ms          QRS Dur : 080 ms      QT Int : 372 ms       P-R-T Axes : 020 -34 060 degrees     QTc Int : 447 ms    Normal sinus rhythm  Left axis deviation  Abnormal ECG  When compared with ECG of 06-MAY-2022 14:25,  No significant change was found  Confirmed by Diane ORTEZ, Lobito ([id number]) on 1/6/2023 8:09:10 AM    Referred By: WILLIAM   SELF           Confirmed By:Lobito Contreras MD                                  Imaging Results              CT Chest Abdomen Pelvis With Contrast (xpd) (Final result)  Result time 01/05/23 16:56:56      Final result by Darryl Lee MD (01/05/23 16:56:56)                   Impression:      1.  No traumatic injury of the thorax, abdomen or pelvis identified.    2.  Details of stable findings above.      Electronically signed by: Darryl Lee  Date:    01/05/2023  Time:    16:56               Narrative:    EXAMINATION:  CT CHEST ABDOMEN PELVIS WITH CONTRAST (XPD)    CLINICAL HISTORY:  fall;    TECHNIQUE:  Multidetector axial images were obtained from the thoracic inlet through the greater trochanters following the administration of IV contrast.    Dose length product of 747 mGycm. Automated exposure control was utilized to minimize radiation dose.    COMPARISON:  April 7, 2022    CHEST FINDINGS:    The lungs are unremarkable without suspicious soft tissue pulmonary nodule, parenchyma consolidation, interstitial disease, pleural effusion or pneumothorax.  There are right lower lung lobe hypoventilatory changes.  Similar size of bilateral  thyroid glands nodules and the larger on the right measures 1.1 cm.    Images are partially degraded by respiratory misregistration. No traumatic finding of the thoracic great vessels identified and there are no dominant mediastinal hematomas. Thoracic spine alignment is preserved. No consistent findings reflective of a displaced fracture.    ABDOMINAL FINDINGS:    Bilateral hepatic lobes very small hypodensities remain stable and probably are cysts.  There is no abdominal solid parenchymal organs traumatic damage with unremarkable attenuation of the liver, pancreas and spleen. Gallbladder wall is not thickened and there is no intra luminal calcified calculus.    The adrenal glands size and configuration is within normal limits. Kidneys are symmetric in size and exhibit symmetric contrast enhancement.  There is stable size of left kidney 3.4 cm cyst for which no further imaging is recommended.  No renal contusion or laceration identified. There is no hydronephrosis or perinephric fluid collection. The abdominal aorta is normal in course and diameter. No retroperitoneal hematoma. There is no extra luminal air. No focal bowel wall thickening or free fluid identified. Lumbar alignment is preserved.    PELVIC FINDINGS:    There is no free fluid. Urinary bladder is mostly decompressed. No evidence for bladder rupture.  Prostate is enlarged in size with few calcifications.  Right inguinal operative changes.  Femoral heads are well situated within their respective acetabula. Pubic symphysis and SI joints are intact. No pelvic fracture identified.                                       CT Cervical Spine Without Contrast (Final result)  Result time 01/05/23 16:48:28      Final result by Darryl Lee MD (01/05/23 16:48:28)                   Impression:      No acute fracture or malalignment identified.      Electronically signed by: Darryl Lee  Date:    01/05/2023  Time:    16:48               Narrative:     EXAMINATION:  CT CERVICAL SPINE WITHOUT CONTRAST    CLINICAL HISTORY:  Trauma.    TECHNIQUE:  Multidetector axial images were performed of the cervical spine without and.  Images were reconstructed.    Automated exposure control was utilized to minimize radiation dose.  DLP 1362.    COMPARISON:  None available.    FINDINGS:  There is solid ACDF at C5, C6 and C7.  Cervical vertebrae stature is maintained and alignment is unremarkable.  No acute fracture or malalignment identified.  There are degenerative changes.  There is no prevertebral soft tissue prominence.    This study does not exclude the possibility of intrathecal soft tissue, ligamentous or vascular injury.                                       CT Head Without Contrast (Final result)  Result time 01/05/23 16:49:48      Final result by Arlene Vo MD (01/05/23 16:49:48)                   Impression:      1. No acute intracranial abnormality.  2. Chronic microvascular ischemic changes.      Electronically signed by: Arlene Vo  Date:    01/05/2023  Time:    16:49               Narrative:    EXAMINATION:  CT HEAD WITHOUT CONTRAST    CLINICAL HISTORY:  Mental status change, unknown cause;    TECHNIQUE:  Axial scans were obtained from skull base to the vertex.    Coronal and sagittal reconstructions obtained from the axial data.    Automatic exposure control was utilized to limit radiation dose.    Contrast: None    Radiation Dose:    Total DLP: 1362 mGy*cm    COMPARISON:  CT head dated 04/07/2022    FINDINGS:  There is no acute intracranial hemorrhage or edema. The gray-white matter differentiation is preserved.  Scattered hypodensities in the subcortical and periventricular white matter likely represent chronic microvascular ischemic changes.    There is no mass effect or midline shift. The ventricles and sulci are normal in size. The basal cisterns are patent. There is no abnormal extra-axial fluid collection.  Carotid artery calcifications are  noted.    The calvarium and skull base are intact.  There is mild scattered paranasal sinus mucosal thickening.  A left mastoid effusion is noted.                                       X-Ray Chest 1 View (Final result)  Result time 01/05/23 15:30:29      Final result by Darryl Lee MD (01/05/23 15:30:29)                   Impression:      NO ACUTE CARDIOPULMONARY PROCESS IDENTIFIED.      Electronically signed by: Darryl Lee  Date:    01/05/2023  Time:    15:30               Narrative:    EXAMINATION:  XR CHEST 1 VIEW    CLINICAL HISTORY:  Weakness    TECHNIQUE:  One view    COMPARISON:  April 7, 2022.    FINDINGS:  Cardiopericardial silhouette is within normal limits.  No acute dense focal or segmental consolidation, congestive process, pleural effusions or pneumothorax.  Partially visualized ACDF.                                       Medications   lactated ringers bolus 1,000 mL (0 mLs Intravenous Stopped 1/5/23 1630)   iopamidoL (ISOVUE-370) injection 100 mL (100 mLs Intravenous Given 1/5/23 1642)     Medical Decision Making  DDX:  Dementia, delirium, ICH, CVA, TIA, electrolyte abnormalities, ACS, arrhythmia, electrolyte abnormalities, renal injury, dehydration, substance abuse    MDM: Buzz Fontenot is a 72 y.o. male with above past medical history who presents to the ED for generalized weakness and some confusion. Vital signs reviewed.  Patient does have a history of Parkinson's disease that is significantly advanced.  Patient has a daily caregiver at home as well as a wife and daughter who helps take care of him.  He has had no recent falls or trauma.  He has had no fevers or chills.  He is currently able to speak to me in simple words and sentences but does appear to be confused.  CT of the head is pending.  Labs are pending.  EKG with no STEMI criteria.  Chest x-ray pending.  IV fluid rehydration ordered.  Continue pulse oximetry and telemetry monitoring.    Update:  The patient's wife has now reported  that the patient fell about a week ago and was being seen as an outpatient for possible fractures to the pelvis, CT of the C-spine and body are pending to evaluate for acute traumatic injury.    Update:  CTs of the head, C-spine, and body are negative for acute traumatic injury.  Labs have been reviewed.  Patient's daughter raises concerns of polypharmacy and substance abuse by the patient, urine drug screen is pending.  Urinalysis without evidence of infection.  Patient's PCP has been consulted to discuss the case and to discuss disposition.    Update:  I have discussed the case with the patient's primary care physician.  The patient has no medical necessity for admission.  Patient's PCP states that they have been managing his Parkinson's outpatient and they are planning to discuss true home health in addition to PT and OT as an outpatient.  Patient has a follow-up appointment scheduled for tomorrow morning at 8:00 a.m..  I have discussed this with the patient's family who are agreeable with discharge home and will follow-up as discussed tomorrow with the patient's PCP.  I have discussed strict return precautions and the patient family have verbalized understanding.  All questions answered at bedside.    Dispo: Discharge    Data Reviewed/Counseling: I have personally reviewed the patient's vital signs, nursing notes, and other relevant tests, information, and imaging. I had a detailed discussion regarding the historical points, exam findings, and any diagnostic results supporting the discharge diagnosis.     Portions of this note were dictated using voice recognition software. Although it was reviewed for accuracy, some inherent voice recognition errors may have occurred and be present in this document.      Problems Addressed:  Generalized weakness: chronic illness or injury with exacerbation, progression, or side effects of treatment  Parkinson disease: chronic illness or injury with exacerbation, progression, or  side effects of treatment  Weakness: chronic illness or injury with exacerbation, progression, or side effects of treatment    Amount and/or Complexity of Data Reviewed  Independent Historian: caregiver     Details: History obtained from the patient's wife, daughter, and caregiver at bedside.  Patient with some generalized weakness and confusion over the past several days.  Patient does have a history of Parkinson's that appears significantly advanced.  External Data Reviewed: labs and notes.  Labs: ordered.  Radiology: ordered and independent interpretation performed.  ECG/medicine tests: ordered and independent interpretation performed.  Discussion of management or test interpretation with external provider(s): I discussed disposition and overall patient care with the patient's primary care physician.    Risk  Decision regarding hospitalization.  Diagnosis or treatment significantly limited by social determinants of health.          Medical Decision Making:   Clinical Tests:   Lab Tests: Ordered and Reviewed  Radiological Study: Ordered and Reviewed  Medical Tests: Reviewed and Ordered        Scribe Attestation:   Scribe #1: I performed the above scribed service and the documentation accurately describes the services I performed. I attest to the accuracy of the note.    Attending Attestation:           Physician Attestation for Scribe:  Physician Attestation Statement for Scribe #1: I, Phil Starr MD, reviewed documentation, as scribed by Susan Cartagena in my presence, and it is both accurate and complete.           ED Course as of 01/09/23 0034   Thu Jan 05, 2023   1534 X-Ray Chest 1 View  Independently visualized/reviewed by me during the ED visit.  - No acute fracture or PTX [MC]   1544 EKG 12-lead  EKG independently interpreted by me.  EKG: NSR @ 87, no STEMI, LAD, QTc 447 [MC]      ED Course User Index  [MC] Phil Starr MD                 Clinical Impression:   Final diagnoses:  [R53.1]  Weakness  [R53.1] Generalized weakness  [G20] Parkinson disease (Primary)        ED Disposition Condition    Discharge Stable          ED Prescriptions    None       Follow-up Information       Follow up With Specialties Details Why Contact Info    Sean Moore II, MD Internal Medicine In 1 day  [address] [facility] [address]  [phone]      [facility] - Emergency Dept Emergency Medicine  If symptoms worsen [address] [facility] [id number] [phone]             Phil Starr MD  01/09/23 0034

## 2023-01-05 NOTE — FIRST PROVIDER EVALUATION
"Medical screening examination initiated.  I have conducted a focused provider triage encounter, findings are as follows:    Brief history of present illness:  72-year-old male presents to ED for evaluation of generalized weakness and increased confusion over the last several days.  Patient with history of Parkinson's.    Vitals:    01/05/23 1231   BP: (!) 142/90   Pulse: 85   Resp: 16   Temp: 98.8 °F (37.1 °C)   TempSrc: Oral   SpO2: 99%   Weight: 77.1 kg (170 lb)   Height: 5' 8" (1.727 m)       Pertinent physical exam:  Patient sitting on stretcher in no acute distress.    Brief workup plan:  Labs, EKG, CT head, UA    Preliminary workup initiated; this workup will be continued and followed by the physician or advanced practice provider that is assigned to the patient when roomed.  "

## 2023-01-05 NOTE — TELEPHONE ENCOUNTER
----- Message from Afshan Hong sent at 1/5/2023  9:32 AM CST -----  Regarding: FW: therapy  Taking out of his head, can't feed himself, decline in the week. She is req telemed can he be added this afternoon?  ----- Message -----  From: Afshan Hong  Sent: 1/5/2023   9:04 AM CST  To: Teresa LABOY II Staff  Subject: therapy                                          Pt's wife/wallcae is req a callback to discuss pt/ot/speech therapy at home, pt is not able to go to facility.  She is req STAT HH.  [phone]

## 2023-01-06 NOTE — TELEPHONE ENCOUNTER
Spoke with patient wife at this time. Wanted to go over medications with us.   Current meds:   Klonopin 0.5 bid   Seroquel 50 q hs   Ropinirole 0.5 bid     D/c remeron (jay told them to stop) explaiend he shouldn't need both remeron and seroquel   Also remove trazodone d/t side effects       Just wanted to make sure this is accurate.

## 2023-01-06 NOTE — TELEPHONE ENCOUNTER
----- Message from Reny Savage, Patient Care Assistant sent at 1/6/2023 10:03 AM CST -----  Regarding: return call  Mrs Azul is req a call back to discuss Mr Gerber's meds with you. She said some were discontinued and she wants to clear that up.    Phone #: [phone]

## 2023-01-06 NOTE — DISCHARGE INSTRUCTIONS
Please follow up with your primary care physician tomorrow as scheduled at 08:10 am.    Please return to the emergency department if you develop any worsening symptoms, fever, chest pain, difficulty breathing, or any other new symptoms or concerns.      Thank you for allowing us to take care of you today.

## 2023-01-10 ENCOUNTER — TELEPHONE (OUTPATIENT)
Dept: INTERNAL MEDICINE | Facility: CLINIC | Age: 73
End: 2023-01-10
Payer: MEDICARE

## 2023-01-10 DIAGNOSIS — R42 DISEQUILIBRIUM: ICD-10-CM

## 2023-01-10 DIAGNOSIS — G20.A1 PARKINSON'S DISEASE: Primary | ICD-10-CM

## 2023-01-10 NOTE — TELEPHONE ENCOUNTER
----- Message from Bethanie Nazario sent at 1/10/2023 10:40 AM CST -----  Ms Azul called asking if a referral can be faxed to [facility] on [address] for PT, OT, Speech therapy, he's now ready to go back.  Is aware Dr Moore is out  [phone]  If you can't she will call Dr Williamson's office to see if they can fax the referral

## 2023-01-19 ENCOUNTER — LAB VISIT (OUTPATIENT)
Dept: LAB | Facility: HOSPITAL | Age: 73
End: 2023-01-19
Attending: DERMATOLOGY
Payer: MEDICARE

## 2023-01-19 DIAGNOSIS — Z79.899 ENCOUNTER FOR LONG-TERM (CURRENT) USE OF OTHER MEDICATIONS: ICD-10-CM

## 2023-01-19 DIAGNOSIS — L40.0 PSORIASIS VULGARIS: Primary | ICD-10-CM

## 2023-01-19 LAB
ALBUMIN SERPL-MCNC: 3.9 G/DL (ref 3.4–4.8)
ALBUMIN/GLOB SERPL: 1.3 RATIO (ref 1.1–2)
ALP SERPL-CCNC: 86 UNIT/L (ref 40–150)
ALT SERPL-CCNC: 15 UNIT/L (ref 0–55)
AST SERPL-CCNC: 17 UNIT/L (ref 5–34)
BASOPHILS # BLD AUTO: 0.04 X10(3)/MCL (ref 0–0.2)
BASOPHILS NFR BLD AUTO: 0.4 %
BILIRUBIN DIRECT+TOT PNL SERPL-MCNC: 0.8 MG/DL
BUN SERPL-MCNC: 14 MG/DL (ref 8.4–25.7)
CALCIUM SERPL-MCNC: 9.6 MG/DL (ref 8.8–10)
CHLORIDE SERPL-SCNC: 107 MMOL/L (ref 98–107)
CO2 SERPL-SCNC: 26 MMOL/L (ref 23–31)
CREAT SERPL-MCNC: 0.95 MG/DL (ref 0.73–1.18)
EOSINOPHIL # BLD AUTO: 0.1 X10(3)/MCL (ref 0–0.9)
EOSINOPHIL NFR BLD AUTO: 1 %
ERYTHROCYTE [DISTWIDTH] IN BLOOD BY AUTOMATED COUNT: 13.3 % (ref 11.5–17)
GFR SERPLBLD CREATININE-BSD FMLA CKD-EPI: >60 MLS/MIN/1.73/M2
GLOBULIN SER-MCNC: 2.9 GM/DL (ref 2.4–3.5)
GLUCOSE SERPL-MCNC: 109 MG/DL (ref 82–115)
HCT VFR BLD AUTO: 43.6 % (ref 42–52)
HGB BLD-MCNC: 14 GM/DL (ref 14–18)
IMM GRANULOCYTES # BLD AUTO: 0.06 X10(3)/MCL (ref 0–0.04)
IMM GRANULOCYTES NFR BLD AUTO: 0.6 %
LYMPHOCYTES # BLD AUTO: 1.31 X10(3)/MCL (ref 0.6–4.6)
LYMPHOCYTES NFR BLD AUTO: 13 %
MCH RBC QN AUTO: 29.4 PG
MCHC RBC AUTO-ENTMCNC: 32.1 MG/DL (ref 33–36)
MCV RBC AUTO: 91.6 FL (ref 80–94)
MONOCYTES # BLD AUTO: 0.45 X10(3)/MCL (ref 0.1–1.3)
MONOCYTES NFR BLD AUTO: 4.5 %
NEUTROPHILS # BLD AUTO: 8.13 X10(3)/MCL (ref 2.1–9.2)
NEUTROPHILS NFR BLD AUTO: 80.5 %
NRBC BLD AUTO-RTO: 0 %
PLATELET # BLD AUTO: 242 X10(3)/MCL (ref 130–400)
PMV BLD AUTO: 9.9 FL (ref 7.4–10.4)
POTASSIUM SERPL-SCNC: 4.6 MMOL/L (ref 3.5–5.1)
PROT SERPL-MCNC: 6.8 GM/DL (ref 5.8–7.6)
RBC # BLD AUTO: 4.76 X10(6)/MCL (ref 4.7–6.1)
SODIUM SERPL-SCNC: 139 MMOL/L (ref 136–145)
WBC # SPEC AUTO: 10.1 X10(3)/MCL (ref 4.5–11.5)

## 2023-01-19 PROCEDURE — 36415 COLL VENOUS BLD VENIPUNCTURE: CPT

## 2023-01-19 PROCEDURE — 86480 TB TEST CELL IMMUN MEASURE: CPT

## 2023-01-19 PROCEDURE — 80053 COMPREHEN METABOLIC PANEL: CPT

## 2023-01-19 PROCEDURE — 85025 COMPLETE CBC W/AUTO DIFF WBC: CPT

## 2023-01-22 LAB
GAMMA INTERFERON BACKGROUND BLD IA-ACNC: 0.01 IU/ML
M TB IFN-G BLD-IMP: NEGATIVE
M TB IFN-G CD4+ BCKGRND COR BLD-ACNC: 0.1 IU/ML
M TB IFN-G CD4+CD8+ BCKGRND COR BLD-ACNC: 0.08 IU/ML
MITOGEN IGNF BCKGRD COR BLD-ACNC: >10 IU/ML

## 2023-02-13 ENCOUNTER — TELEPHONE (OUTPATIENT)
Dept: INTERNAL MEDICINE | Facility: CLINIC | Age: 73
End: 2023-02-13
Payer: MEDICARE

## 2023-02-13 DIAGNOSIS — J40 BRONCHITIS: Primary | ICD-10-CM

## 2023-02-13 PROBLEM — Z00.00 WELLNESS EXAMINATION: Status: RESOLVED | Noted: 2022-11-09 | Resolved: 2023-02-13

## 2023-02-13 RX ORDER — ALBUTEROL SULFATE 2.5 MG/.5ML
2.5 SOLUTION RESPIRATORY (INHALATION) 2 TIMES DAILY
Qty: 60 EACH | Refills: 1 | Status: SHIPPED | OUTPATIENT
Start: 2023-02-13 | End: 2023-04-12

## 2023-02-13 RX ORDER — ALBUTEROL SULFATE 90 UG/1
2 AEROSOL, METERED RESPIRATORY (INHALATION) EVERY 6 HOURS PRN
Qty: 18 G | Refills: 1 | Status: SHIPPED | OUTPATIENT
Start: 2023-02-13 | End: 2023-06-13 | Stop reason: SDUPTHER

## 2023-02-14 RX ORDER — BENZONATATE 100 MG/1
100 CAPSULE ORAL 3 TIMES DAILY PRN
Qty: 30 CAPSULE | Refills: 0 | Status: SHIPPED | OUTPATIENT
Start: 2023-02-14 | End: 2023-02-24

## 2023-02-21 RX ORDER — ACETAMINOPHEN AND CODEINE PHOSPHATE 300; 30 MG/1; MG/1
1 TABLET ORAL EVERY 8 HOURS PRN
Qty: 7 TABLET | Refills: 0 | Status: SHIPPED | OUTPATIENT
Start: 2023-02-21 | End: 2023-02-23

## 2023-02-21 RX ORDER — BACLOFEN 10 MG/1
10 TABLET ORAL 2 TIMES DAILY
Qty: 7 TABLET | Refills: 0 | Status: SHIPPED | OUTPATIENT
Start: 2023-02-21 | End: 2023-11-22

## 2023-02-21 NOTE — PROGRESS NOTES
Patient wife called , do to severe pain , has been in [facility]  no acute findings .    I called Tylenol #3 and baclofen do to severe spastic pain secondary to Parkinsosns disease

## 2023-02-23 ENCOUNTER — TELEPHONE (OUTPATIENT)
Dept: ADMINISTRATIVE | Facility: HOSPITAL | Age: 73
End: 2023-02-23
Payer: MEDICARE

## 2023-02-23 NOTE — TELEPHONE ENCOUNTER
Spoke with wallace at this time. He looks better and is ok with holding meds, will think about palliative care and get back to us

## 2023-02-23 NOTE — TELEPHONE ENCOUNTER
I think he is on too many medications.  I would recommend stopping prazosin and Humira.  Stop Tylenol No. 3 and/or hydrocodone.    Refer to palliative care or hospice if they are willing

## 2023-04-12 ENCOUNTER — TELEPHONE (OUTPATIENT)
Dept: INTERNAL MEDICINE | Facility: CLINIC | Age: 73
End: 2023-04-12
Payer: MEDICARE

## 2023-04-12 RX ORDER — BLACK COHOSH ROOT 200 MG
0.5 CAPSULE ORAL 2 TIMES DAILY
COMMUNITY
Start: 2023-03-29 | End: 2023-12-22

## 2023-04-12 RX ORDER — DULOXETIN HYDROCHLORIDE 60 MG/1
60 CAPSULE, DELAYED RELEASE ORAL EVERY MORNING
COMMUNITY
Start: 2023-03-29

## 2023-04-12 RX ORDER — ALBUTEROL SULFATE 0.83 MG/ML
SOLUTION RESPIRATORY (INHALATION)
COMMUNITY
Start: 2023-02-13 | End: 2023-05-23 | Stop reason: SDUPTHER

## 2023-04-12 RX ORDER — OLANZAPINE 5 MG/1
1 TABLET, ORALLY DISINTEGRATING ORAL 2 TIMES DAILY
COMMUNITY
Start: 2023-03-29

## 2023-04-12 NOTE — TELEPHONE ENCOUNTER
Patient's wife called, he was given a rx of Cymbalta 60mg and 30mg from Hospice. He only has the 60mg and wants to know if you can send in the 30mg.    [facility]

## 2023-04-12 NOTE — TELEPHONE ENCOUNTER
Spoke with patient wife at this time. Explained to patient wife that cymbalta was discontined when he was in the hospital a few months ago. She stated dr. Williamson prescribed this for him recently. I explained to her if [facility] put him on this again, she needs to discuss any dosage changes with his office. ROBB

## 2023-05-23 ENCOUNTER — OFFICE VISIT (OUTPATIENT)
Dept: INTERNAL MEDICINE | Facility: CLINIC | Age: 73
End: 2023-05-23
Payer: MEDICARE

## 2023-05-23 ENCOUNTER — TELEPHONE (OUTPATIENT)
Dept: INTERNAL MEDICINE | Facility: CLINIC | Age: 73
End: 2023-05-23

## 2023-05-23 DIAGNOSIS — R06.00 DYSPNEA, UNSPECIFIED TYPE: ICD-10-CM

## 2023-05-23 DIAGNOSIS — J40 BRONCHITIS: Primary | ICD-10-CM

## 2023-05-23 DIAGNOSIS — R05.9 COUGH, UNSPECIFIED TYPE: Primary | ICD-10-CM

## 2023-05-23 DIAGNOSIS — J32.9 SINUSITIS, UNSPECIFIED CHRONICITY, UNSPECIFIED LOCATION: ICD-10-CM

## 2023-05-23 PROCEDURE — 99213 PR OFFICE/OUTPT VISIT, EST, LEVL III, 20-29 MIN: ICD-10-PCS | Mod: 95,,, | Performed by: INTERNAL MEDICINE

## 2023-05-23 PROCEDURE — 99213 OFFICE O/P EST LOW 20 MIN: CPT | Mod: 95,,, | Performed by: INTERNAL MEDICINE

## 2023-05-23 RX ORDER — DULOXETIN HYDROCHLORIDE 30 MG/1
30 CAPSULE, DELAYED RELEASE ORAL EVERY MORNING
COMMUNITY
Start: 2023-05-02

## 2023-05-23 RX ORDER — AZITHROMYCIN 500 MG/1
500 TABLET, FILM COATED ORAL DAILY
Qty: 5 TABLET | Refills: 0 | Status: SHIPPED | OUTPATIENT
Start: 2023-05-23 | End: 2023-05-28

## 2023-05-23 RX ORDER — ALBUTEROL SULFATE 0.83 MG/ML
2.5 SOLUTION RESPIRATORY (INHALATION) 3 TIMES DAILY
Qty: 30 ML | Refills: 1 | Status: SHIPPED | OUTPATIENT
Start: 2023-05-23 | End: 2023-06-13

## 2023-05-23 NOTE — TELEPHONE ENCOUNTER
Spoke with wallace at this time. Explained to continue the course abx called in and to notify us of any changes or new developments.

## 2023-05-23 NOTE — PROGRESS NOTES
Subjective:       Patient ID: Buzz Fontenot is a 72 y.o. male.    Chief Complaint: No chief complaint on file.    The patient location is: Home, [facility]  The chief complaint leading to consultation is: Sinus congestion    Visit type: audiovisual    Face to Face time with patient: 10 minutes  20 minutes of total time spent on the encounter, which includes face to face time and non-face to face time preparing to see the patient (eg, review of tests), Obtaining and/or reviewing separately obtained history, Documenting clinical information in the electronic or other health record, Independently interpreting results (not separately reported) and communicating results to the patient/family/caregiver, or Care coordination (not separately reported).         Each patient to whom he or she provides medical services by telemedicine is:  (1) informed of the relationship between the physician and patient and the respective role of any other health care provider with respect to management of the patient; and (2) notified that he or she may decline to receive medical services by telemedicine and may withdraw from such care at any time.    Notes:    72-year-old male reports sinus congestion, cough, and dyspnea over the past 24 hours.  He denies fever.  He has not tested for COVID-19 yet.  He has a history of chronic rhinosinusitis    Cough  This is a recurrent problem. The current episode started yesterday. The problem has been gradually worsening. The problem occurs every few minutes. The cough is Productive of sputum. Associated symptoms include nasal congestion, rhinorrhea, shortness of breath and wheezing. Pertinent negatives include no chest pain, chills, ear congestion, ear pain, fever, headaches, heartburn, hemoptysis, myalgias, postnasal drip, rash, sore throat, sweats or weight loss. The symptoms are aggravated by stress. He has tried nothing for the symptoms. The treatment provided moderate relief. There is no  history of asthma, bronchiectasis, bronchitis, COPD, emphysema, environmental allergies or pneumonia.   Review of Systems   Constitutional:  Negative for chills, fever and weight loss.   HENT:  Positive for rhinorrhea. Negative for ear pain, postnasal drip and sore throat.    Respiratory:  Positive for cough, shortness of breath and wheezing. Negative for hemoptysis.    Cardiovascular:  Negative for chest pain.   Gastrointestinal:  Negative for heartburn.   Musculoskeletal:  Negative for myalgias.   Integumentary:  Negative for rash.   Allergic/Immunologic: Negative for environmental allergies.   Neurological:  Negative for headaches.       Objective:      Physical Exam  Constitutional:       General: He is not in acute distress.  Pulmonary:      Effort: No respiratory distress.   Neurological:      Mental Status: He is alert and oriented to person, place, and time.   Psychiatric:         Mood and Affect: Mood normal.       There were no vitals filed for this visit.   Assessment:       Problem List Items Addressed This Visit    None      Medication List with Changes/Refills   Current Medications    ALBUTEROL (PROAIR HFA) 90 MCG/ACTUATION INHALER    Inhale 2 puffs into the lungs every 6 (six) hours as needed for Wheezing. Rescue    ALBUTEROL (PROVENTIL) 2.5 MG /3 ML (0.083 %) NEBULIZER SOLUTION        BACLOFEN (LIORESAL) 10 MG TABLET    Take 1 tablet (10 mg total) by mouth 2 (two) times daily.    CLONAZEPAM (KLONOPIN) 0.5 MG TABLET    Take 1 tablet (0.5 mg total) by mouth 2 (two) times daily.    DULOXETINE (CYMBALTA) 30 MG CAPSULE    Take 30 mg by mouth every morning.    DULOXETINE (CYMBALTA) 60 MG CAPSULE    Take 60 mg by mouth every morning.    KETOCONAZOLE (NIZORAL) 2 % SHAMPOO    Apply topically.    OLANZAPINE ZYDIS (ZYPREXA) 5 MG TBDL    Take 1 tablet by mouth 2 (two) times a day.    PANTOPRAZOLE (PROTONIX) 40 MG TABLET    Take 1 tablet (40 mg total) by mouth once daily.    QUETIAPINE (SEROQUEL) 50 MG TABLET     Take 1 tablet (50 mg total) by mouth nightly.    ROPINIROLE (REQUIP) 0.5 MG TABLET    TAKE ONE TABLET BY MOUTH TWICE DAILY.    ROSUVASTATIN (CRESTOR) 20 MG TABLET    Take 1 tablet (20 mg total) by mouth once daily.    TAMSULOSIN (FLOMAX) 0.4 MG CAP    Take by mouth once daily.    VITAMIN C 1000 MG TABLET    Take 0.5 tablets by mouth 2 (two) times a day.        Plan:       Sinusitis   Cough  Dyspnea     Azithromycin daily for 5 days.  Refill albuterol nebulizer solution.  I advised him to check for COVID-19 with home testing kit

## 2023-06-13 ENCOUNTER — TELEPHONE (OUTPATIENT)
Dept: INTERNAL MEDICINE | Facility: CLINIC | Age: 73
End: 2023-06-13
Payer: MEDICARE

## 2023-06-13 DIAGNOSIS — J40 BRONCHITIS: ICD-10-CM

## 2023-06-13 DIAGNOSIS — G20.A1 PARKINSON'S DISEASE: Primary | ICD-10-CM

## 2023-06-13 RX ORDER — ALBUTEROL SULFATE 0.83 MG/ML
SOLUTION RESPIRATORY (INHALATION)
Qty: 180 ML | Refills: 1 | Status: SHIPPED | OUTPATIENT
Start: 2023-06-13 | End: 2023-08-11 | Stop reason: SDUPTHER

## 2023-06-13 RX ORDER — ALBUTEROL SULFATE 90 UG/1
2 AEROSOL, METERED RESPIRATORY (INHALATION) EVERY 6 HOURS PRN
Qty: 18 G | Refills: 1 | Status: SHIPPED | OUTPATIENT
Start: 2023-06-13

## 2023-06-13 NOTE — TELEPHONE ENCOUNTER
Wife called for refill on the Albuterol, just realized they had none left. Refill sent at this time

## 2023-06-13 NOTE — TELEPHONE ENCOUNTER
Wife called she is needing a [facility] ph[facility] [phone] [phone]

## 2023-06-19 ENCOUNTER — TELEPHONE (OUTPATIENT)
Dept: INTERNAL MEDICINE | Facility: CLINIC | Age: 73
End: 2023-06-19
Payer: MEDICARE

## 2023-06-19 DIAGNOSIS — Z00.00 WELLNESS EXAMINATION: ICD-10-CM

## 2023-06-19 DIAGNOSIS — G20.A1 PARKINSON'S DISEASE: Primary | ICD-10-CM

## 2023-06-19 RX ORDER — ROPINIROLE 0.5 MG/1
0.5 TABLET, FILM COATED ORAL 2 TIMES DAILY
Qty: 60 TABLET | Refills: 5 | Status: SHIPPED | OUTPATIENT
Start: 2023-06-19

## 2023-06-19 NOTE — TELEPHONE ENCOUNTER
Spoke with wallace at this time. Can we order palliative care and not hospice. Still wants to go to therapy and such but just get extra help.

## 2023-06-19 NOTE — TELEPHONE ENCOUNTER
----- Message from Karolina Owens sent at 6/19/2023 11:52 AM CDT -----  Regarding: advice  .Type:  Needs Medical Advice    Who Called: pt's wife  Latha    Would the patient rather a call back or a response via [facility]? C/b  Best Call Back Number: [phone]  Additional Information: pt's wife would like a call back to discuss hospice

## 2023-07-18 ENCOUNTER — TELEPHONE (OUTPATIENT)
Dept: INTERNAL MEDICINE | Facility: CLINIC | Age: 73
End: 2023-07-18
Payer: MEDICARE

## 2023-07-18 DIAGNOSIS — R42 DISEQUILIBRIUM: ICD-10-CM

## 2023-07-18 DIAGNOSIS — G20.A1 PARKINSON'S DISEASE: Primary | ICD-10-CM

## 2023-07-18 NOTE — TELEPHONE ENCOUNTER
Latha stated her  needed a renewal on his PT/OT referral. I advised her Doc was out but I would place the order and as soon as he and Araceli are back they would sign and fax.

## 2023-07-18 NOTE — TELEPHONE ENCOUNTER
----- Message from Jeffrey Cespedes sent at 7/18/2023  8:56 AM CDT -----  .Type:  Needs Medical Advice    Who Called: Latha   Symptoms (please be specific):    How long has patient had these symptoms:    Pharmacy name and phone #:    Would the patient rather a call back or a response via [facility]?   Best Call Back Number: [phone]  Additional Information: She needs a referral set up for her  for both OT and PT please give her a call back to set this up.

## 2023-08-11 ENCOUNTER — TELEPHONE (OUTPATIENT)
Dept: INTERNAL MEDICINE | Facility: CLINIC | Age: 73
End: 2023-08-11
Payer: MEDICARE

## 2023-08-11 DIAGNOSIS — J40 BRONCHITIS: ICD-10-CM

## 2023-08-11 RX ORDER — ALBUTEROL SULFATE 0.83 MG/ML
2.5 SOLUTION RESPIRATORY (INHALATION) EVERY 6 HOURS
Qty: 30 ML | Refills: 5 | Status: SHIPPED | OUTPATIENT
Start: 2023-08-11 | End: 2023-08-11

## 2023-08-11 RX ORDER — ALBUTEROL SULFATE 0.83 MG/ML
SOLUTION RESPIRATORY (INHALATION)
Qty: 180 ML | Refills: 1 | Status: SHIPPED | OUTPATIENT
Start: 2023-08-11 | End: 2023-11-21

## 2023-08-11 NOTE — TELEPHONE ENCOUNTER
----- Message from Tara Ponce sent at 8/11/2023  8:56 AM CDT -----  Regarding: med refill  Type:  RX Refill Request    Who Called: Latha, patient's wife  Refill or New Rx:refill  RX Name and Strength:albuterol (PROVENTIL) 2.5 mg /3 mL (0.083 %) nebulizer solution   How is the patient currently taking it? (ex. 1XDay):  Is this a 30 day or 90 day RX:30 day  Preferred Pharmacy with phone number:[facility] on [facility]  Local or Mail Order:local  Ordering Provider:Andrewitier  Would the patient rather a call back or a response via [facility]?   Best Call Back Number:[phone]  Additional Information: please call Latha back to confirm.

## 2023-08-16 ENCOUNTER — TELEPHONE (OUTPATIENT)
Dept: INTERNAL MEDICINE | Facility: CLINIC | Age: 73
End: 2023-08-16
Payer: MEDICARE

## 2023-08-23 DIAGNOSIS — K21.9 GASTROESOPHAGEAL REFLUX DISEASE, UNSPECIFIED WHETHER ESOPHAGITIS PRESENT: ICD-10-CM

## 2023-08-24 RX ORDER — PANTOPRAZOLE SODIUM 40 MG/1
40 TABLET, DELAYED RELEASE ORAL
Qty: 90 TABLET | Refills: 3 | Status: SHIPPED | OUTPATIENT
Start: 2023-08-24

## 2023-09-11 ENCOUNTER — TELEPHONE (OUTPATIENT)
Dept: INTERNAL MEDICINE | Facility: CLINIC | Age: 73
End: 2023-09-11
Payer: MEDICARE

## 2023-09-11 DIAGNOSIS — R05.9 COUGH, UNSPECIFIED TYPE: ICD-10-CM

## 2023-09-11 DIAGNOSIS — G20.A1 PARKINSON'S DISEASE: Primary | ICD-10-CM

## 2023-09-11 NOTE — TELEPHONE ENCOUNTER
----- Message from Liliana Mckenna sent at 9/11/2023 12:24 PM CDT -----  .Type:  Needs Medical Advice    Who Called: pt     Would the patient rather a call back or a response via [facility]? Call back   Best Call Back Number: [phone]  Additional Information: Can  order a portable  x ray at home  went to [facility] and the machines were down they told her to go to the ER

## 2023-09-11 NOTE — TELEPHONE ENCOUNTER
Spoke with the patient's wife this morning . She stated that the patient is currently undergoing speech therapy with [facility]. Kelsey from [facility] PT stated that the patient having an difficulty time swallowing. Kelsey wanted to know If an X-ray can be ordered to rule out pneumonia.  Patient last progress note scanned in his scan on 09/5.Please advise?

## 2023-09-11 NOTE — TELEPHONE ENCOUNTER
----- Message from Liliana Mckenna sent at 9/11/2023  8:53 AM CDT -----  .Type:  Needs Medical Advice    Who Called: pt wife   Symptoms (please be specific): not swallowing    How long has patient had these symptoms:  2 or 3 day     Would the patient rather a call back or a response via [facility]? Call back   Best Call Back Number: [phone]  Additional Information: pt is requesting a referral for an x-ray to rule out pneumonia

## 2023-09-11 NOTE — TELEPHONE ENCOUNTER
Spoke with patient wife and explained if he is not affiliated with  he cannot order a portable xray. Latha verbalized understanding.

## 2023-09-22 ENCOUNTER — TELEPHONE (OUTPATIENT)
Dept: INTERNAL MEDICINE | Facility: CLINIC | Age: 73
End: 2023-09-22
Payer: MEDICARE

## 2023-09-22 NOTE — TELEPHONE ENCOUNTER
----- Message from Yasir Basilio sent at 9/22/2023  8:05 AM CDT -----  .Type:  Needs Medical Advice    Who Called: pt's wife  Symptoms (please be specific):    How long has patient had these symptoms:    Pharmacy name and phone #:    Would the patient rather a call back or a response via [facility]? Call back  Best Call Back Number: [phone]  Additional Information: needing to discuss [facility] benefits wanting to speak with Araceli

## 2023-09-22 NOTE — TELEPHONE ENCOUNTER
[facility]aw [facility] for [facility] up. Is wondering about [facility] forms. Explained normally patients bring us forms to fill out given to them by the [facility] for assistance. Explained we do not have the forms here.

## 2023-11-07 ENCOUNTER — TELEPHONE (OUTPATIENT)
Dept: INTERNAL MEDICINE | Facility: CLINIC | Age: 73
End: 2023-11-07
Payer: MEDICARE

## 2023-11-07 DIAGNOSIS — R55 SYNCOPE AND COLLAPSE: ICD-10-CM

## 2023-11-07 DIAGNOSIS — R53.1 WEAKNESS: ICD-10-CM

## 2023-11-07 DIAGNOSIS — G20.B1 PARKINSON'S DISEASE WITH DYSKINESIA, UNSPECIFIED WHETHER MANIFESTATIONS FLUCTUATE: Primary | ICD-10-CM

## 2023-11-07 NOTE — TELEPHONE ENCOUNTER
----- Message from Evonne Yoon sent at 11/7/2023  9:30 AM CST -----  Regarding: advice  Type:  Needs Medical Advice    Who Called: pt wife wallace  Symptoms (please be specific):    How long has patient had these symptoms:    Pharmacy name and phone #:    Would the patient rather a call back or a response via [facility]?   Best Call Back Number: [phone]  Additional Information: called about needing an order for pt not being able to attend outpatient anymore and I needing an order for him to do PT, OT and speech with home health. Please advise

## 2023-11-07 NOTE — TELEPHONE ENCOUNTER
The patient's wife wants to set up home health services Patient's wife  wanted  to know who you recommend? Please advise?

## 2023-11-10 DIAGNOSIS — E78.5 HYPERLIPIDEMIA, UNSPECIFIED HYPERLIPIDEMIA TYPE: ICD-10-CM

## 2023-11-13 RX ORDER — ROSUVASTATIN CALCIUM 20 MG/1
20 TABLET, COATED ORAL
Qty: 90 TABLET | Refills: 3 | Status: SHIPPED | OUTPATIENT
Start: 2023-11-13 | End: 2023-12-22

## 2023-11-14 PROCEDURE — G0180 MD CERTIFICATION HHA PATIENT: HCPCS | Mod: ,,, | Performed by: INTERNAL MEDICINE

## 2023-11-14 PROCEDURE — G0180 PR HOME HEALTH MD CERTIFICATION: ICD-10-PCS | Mod: ,,, | Performed by: INTERNAL MEDICINE

## 2023-11-15 ENCOUNTER — TELEPHONE (OUTPATIENT)
Dept: INTERNAL MEDICINE | Facility: CLINIC | Age: 73
End: 2023-11-15
Payer: MEDICARE

## 2023-11-21 DIAGNOSIS — J40 BRONCHITIS: ICD-10-CM

## 2023-11-21 RX ORDER — ALBUTEROL SULFATE 0.83 MG/ML
SOLUTION RESPIRATORY (INHALATION)
Qty: 90 ML | Refills: 5 | Status: SHIPPED | OUTPATIENT
Start: 2023-11-21 | End: 2024-02-21

## 2023-11-22 ENCOUNTER — TELEPHONE (OUTPATIENT)
Dept: INTERNAL MEDICINE | Facility: CLINIC | Age: 73
End: 2023-11-22
Payer: MEDICARE

## 2023-11-22 ENCOUNTER — OFFICE VISIT (OUTPATIENT)
Dept: INTERNAL MEDICINE | Facility: CLINIC | Age: 73
End: 2023-11-22
Payer: MEDICARE

## 2023-11-22 VITALS
WEIGHT: 158 LBS | HEIGHT: 68 IN | DIASTOLIC BLOOD PRESSURE: 70 MMHG | SYSTOLIC BLOOD PRESSURE: 119 MMHG | BODY MASS INDEX: 23.95 KG/M2

## 2023-11-22 DIAGNOSIS — I10 PRIMARY HYPERTENSION: ICD-10-CM

## 2023-11-22 DIAGNOSIS — I70.0 AORTIC ATHEROSCLEROSIS: ICD-10-CM

## 2023-11-22 DIAGNOSIS — D64.9 CHRONIC ANEMIA: ICD-10-CM

## 2023-11-22 DIAGNOSIS — F32.5 MAJOR DEPRESSION IN REMISSION: ICD-10-CM

## 2023-11-22 DIAGNOSIS — Z00.00 WELLNESS EXAMINATION: ICD-10-CM

## 2023-11-22 DIAGNOSIS — E78.49 OTHER HYPERLIPIDEMIA: ICD-10-CM

## 2023-11-22 DIAGNOSIS — G20.B1 PARKINSON'S DISEASE WITH DYSKINESIA, UNSPECIFIED WHETHER MANIFESTATIONS FLUCTUATE: Primary | ICD-10-CM

## 2023-11-22 PROCEDURE — G0439 PR MEDICARE ANNUAL WELLNESS SUBSEQUENT VISIT: ICD-10-PCS | Mod: 95,,, | Performed by: INTERNAL MEDICINE

## 2023-11-22 PROCEDURE — G0439 PPPS, SUBSEQ VISIT: HCPCS | Mod: 95,,, | Performed by: INTERNAL MEDICINE

## 2023-11-22 RX ORDER — MIDODRINE HYDROCHLORIDE 5 MG/1
5 TABLET ORAL 2 TIMES DAILY
COMMUNITY
Start: 2023-10-30 | End: 2023-11-22 | Stop reason: SDUPTHER

## 2023-11-22 RX ORDER — CLONIDINE HYDROCHLORIDE 0.1 MG/1
0.1 TABLET ORAL EVERY 6 HOURS PRN
Qty: 30 TABLET | Refills: 0 | Status: SHIPPED | OUTPATIENT
Start: 2023-11-22 | End: 2023-12-28

## 2023-11-22 RX ORDER — HYOSCYAMINE SULFATE 0.12 MG/1
0.12 TABLET SUBLINGUAL DAILY PRN
Qty: 60 TABLET | Refills: 3 | Status: SHIPPED | OUTPATIENT
Start: 2023-11-22

## 2023-11-22 RX ORDER — MIDODRINE HYDROCHLORIDE 5 MG/1
5 TABLET ORAL 2 TIMES DAILY
Qty: 60 TABLET | Refills: 10 | Status: SHIPPED | OUTPATIENT
Start: 2023-11-22

## 2023-11-22 NOTE — PROGRESS NOTES
Subjective:       Patient ID: Buzz Fontenot is a 72 y.o. male.      Patient Care Team:  Sean Moore II, MD as PCP - General (Internal Medicine)  Sean Moore II, MD    Chief Complaint: Medicare AWV      The patient location is: [facility]  The chief complaint leading to consultation is: Wellness and Parkinsons    Visit type: audiovisual    Face to Face time with patient: 15 minutes  30 minutes of total time spent on the encounter, which includes face to face time and non-face to face time preparing to see the patient (eg, review of tests), Obtaining and/or reviewing separately obtained history, Documenting clinical information in the electronic or other health record, Independently interpreting results (not separately reported) and communicating results to the patient/family/caregiver, or Care coordination (not separately reported).         Each patient to whom he or she provides medical services by telemedicine is:  (1) informed of the relationship between the physician and patient and the respective role of any other health care provider with respect to management of the patient; and (2) notified that he or she may decline to receive medical services by telemedicine and may withdraw from such care at any time.    Notes:      72-year-old white male is evaluated today for follow-up of Parkinson's, hypertension, and hyperlipidemia among other conditions.  He is evaluated by telemedicine after verbal consent.  His wife is present at bedside.      Review of Systems   Constitutional:  Positive for activity change and unexpected weight change. Negative for fever.   HENT:  Positive for trouble swallowing. Negative for hearing loss, nosebleeds and rhinorrhea.    Eyes:  Negative for discharge and visual disturbance.   Respiratory:  Negative for chest tightness, shortness of breath and wheezing.    Cardiovascular:  Negative for chest pain and palpitations.   Gastrointestinal:  Positive for constipation.  Negative for abdominal pain, blood in stool, diarrhea and vomiting.   Endocrine: Negative for polydipsia and polyuria.   Genitourinary:  Negative for difficulty urinating, dysuria, hematuria and urgency.   Musculoskeletal:  Negative for arthralgias, gait problem, joint swelling and neck pain.   Neurological:  Positive for weakness. Negative for headaches.   Psychiatric/Behavioral:  Positive for confusion and dysphoric mood.            Patient Reported Health Risk Assessment  What [age]  Are you male or female?: Male  During the past four weeks, how much have you been bothered by emotional problems such as feeling anxious, depressed, irritable, sad, or downhearted and blue?: Not at all  During the past five weeks, has your physical and/or emotional health limited your social activities with family, friends, neighbors, or groups?: Not at all  During the past four weeks, how much bodily pain have you generally had?: Moderate pain  During the past four weeks, was someone available to help if you needed and wanted help?: Yes, as much as I wanted  During the past four weeks, what was the hardest physical activity you could do for at least two minutes?: Moderate  Can you get to places out of walking distance without help?  (For example, can you travel alone on buses or taxis, or drive your own car?): No  Can you go shopping for groceries or clothes without someone's help?: No  Can you prepare your own meals?: No  Can you do your own housework without help?: No  Because of any health problems, do you need the help of another person with your personal care needs such as eating, bathing, dressing, or getting around the house?: Yes  Can you handle your own money without help?: No  During the past four weeks, how would you rate your health in general?: Excellent  How have things been going for you during the past four weeks?: Good and bad parts about equal  Are you having difficulties driving your car?: Yes,  "often  Do you always fasten your seat belt when you are in a car?: Yes, usually  How often in the past four weeks have you been bothered by falling or dizzy when standing up?: Never  How often in the past four weeks have you been bothered by sexual problems?: Never  How often in the past four weeks have you been bothered by trouble eating well?: Never  How often in the past four weeks have you been bothered by teeth or denture problems?: Never  How often in the past four weeks have you been bothered with problems using the telephone?: Never  How often in the past four weeks have you been bothered by tiredness or fatigue?: Never  Have you fallen two or more times in the past year?: No  Are you afraid of falling?: No  Are you a smoker?: No  During the past four weeks, how many drinks of wine, beer, or other alcoholic beverages did you have?: No alcohol at all  Do you exercise for about 20 minutes three or more days a week?: Yes, most of the time  Have you been given any information to help you with hazards in your house that might hurt you?: Yes  Have you been given any information to help you with keeping track of your medications?: Yes  How often do you have trouble taking medicines the way you've been told to take them?: I always take them as prescribed  How confident are you that you can control and manage most of your health problems?: Very confident  What is your race? (Check all that apply.):       Objective:      Physical Exam  Constitutional:       Appearance: He is ill-appearing.   Pulmonary:      Effort: No respiratory distress.   Neurological:      Mental Status: He is alert. Mental status is at baseline.   Psychiatric:         Mood and Affect: Mood normal.         Vitals:    11/22/23 1249   BP: 119/70   Weight: 71.7 kg (158 lb)   Height: 5' 8" (1.727 m)                   No data to display                  11/22/2023     1:00 PM 5/23/2023     1:15 PM 12/21/2022     2:30 PM 11/9/2022    10:00 AM " 8/12/2022    10:00 AM   Fall Risk Assessment - Outpatient   Mobility Status Ambulatory Wheelchair Bound Ambulatory Wheelchair Bound Wheelchair Bound   Number of falls 0 0 0 0 2+   Identified as fall risk False True False True True           Depression Screening  Over the past two weeks, has the patient felt down, depressed, or hopeless?: No  Over the past two weeks, has the patient felt little interest or pleasure in doing things?: No  Functional Ability/Safety Screening  Was the patient's timed Up & Go test unsteady or longer than 30 seconds?: No  Does the patient need help with phone, transportation, shopping, preparing meals, housework, laundry, meds, or managing money?: No  Does the patient's home have rugs in the hallway, lack grab bars in the bathroom, lack handrails on the stairs or have poor lighting?: No  Have you noticed any hearing difficulties?: No  Cognitive Function (Assessed through direct observation with due consideration of information obtained by way of patient reports and/or concerns raised by family, friends, caretakers, or others)    Does the patient repeat questions/statements in the same day?: No  Does the patient have trouble remembering the date, year, and time?: No  Does the patient have difficulty managing finances?: No  Does the patient have a decreased sense of direction?: No      Assessment:       Problem List Items Addressed This Visit          Neuro    Parkinson's disease - Primary       Psychiatric    Major depression in remission       Cardiac/Vascular    Hypertension    Hyperlipidemia    Aortic atherosclerosis       Oncology    Chronic anemia       Other    RESOLVED: Wellness examination         Medication List with Changes/Refills   New Medications    CLONIDINE (CATAPRES) 0.1 MG TABLET    Take 1 tablet (0.1 mg total) by mouth every 6 (six) hours as needed (If systolic BP is greater than 180).    HYOSCYAMINE (LEVSIN/SL) 0.125 MG SUBL    Take 1 tablet (0.125 mg total) by mouth  "daily as needed (Oral secretions).   Current Medications    ALBUTEROL (PROAIR HFA) 90 MCG/ACTUATION INHALER    Inhale 2 puffs into the lungs every 6 (six) hours as needed for Wheezing. Rescue    ALBUTEROL (PROVENTIL) 2.5 MG /3 ML (0.083 %) NEBULIZER SOLUTION    USE 1 AMPULE (3 mLs (2.5 mg total)) by nebulization every 6 (six) hours AS NEEDED. Rescue    CLONAZEPAM (KLONOPIN) 0.5 MG TABLET    Take 1 tablet (0.5 mg total) by mouth 2 (two) times daily.    DULOXETINE (CYMBALTA) 30 MG CAPSULE    Take 30 mg by mouth every morning.    DULOXETINE (CYMBALTA) 60 MG CAPSULE    Take 60 mg by mouth every morning.    KETOCONAZOLE (NIZORAL) 2 % SHAMPOO    Apply topically.    OLANZAPINE ZYDIS (ZYPREXA) 5 MG TBDL    Take 1 tablet by mouth 2 (two) times a day.    PANTOPRAZOLE (PROTONIX) 40 MG TABLET    TAKE 1 TABLET BY MOUTH ONCE DAILY...    ROPINIROLE (REQUIP) 0.5 MG TABLET    Take 1 tablet (0.5 mg total) by mouth 2 (two) times daily.    ROSUVASTATIN (CRESTOR) 20 MG TABLET    TAKE 1 TABLET BY MOUTH ONCE DAILY...    TAMSULOSIN (FLOMAX) 0.4 MG CAP    Take by mouth once daily.    VITAMIN C 1000 MG TABLET    Take 0.5 tablets by mouth 2 (two) times a day.   Changed and/or Refilled Medications    Modified Medication Previous Medication    MIDODRINE (PROAMATINE) 5 MG TAB midodrine (PROAMATINE) 5 MG Tab       Take 1 tablet (5 mg total) by mouth 2 (two) times daily.    Take 5 mg by mouth 2 (two) times daily.   Discontinued Medications    BACLOFEN (LIORESAL) 10 MG TABLET    Take 1 tablet (10 mg total) by mouth 2 (two) times daily.    QUETIAPINE (SEROQUEL) 50 MG TABLET    Take 1 tablet (50 mg total) by mouth nightly.        Plan:       1. Psoriasis: He was followed by Dr. Kam     2. Mild coronary artery disease: Angiogram in 2013 showed "less than 30% lesion." No longer on aspirin, was followed by Dr. Leal     3. Benign prostatic hypertrophy: He is followed by Dr. Anglin. Flomax was discontinued because of syncope in 2018, now back on " Flomax. He is incontinent.      4. Hypogonadism: He is no longer on testosterone replacement     5. Insomnia: On Zyprexa and clonazepam     6. Hyperlipidemia: LDL at goal     7. Hypertension: Amlodipine was discontinued because of hypotension.  Clonidine as needed     8. Parkinson's disease: Diagnosed in 2017. Multiple medications were discontinued while in the hospital in 2018, including Wellbutrin, Exelon patch, Flomax, midodrine. Followed by Dr. Becerra.  No longer on Sinemet.  He is back on tamsulosin and midodrine.  Hyoscyamine daily for excess oral secretions.  Because of dysphagia, he has home health with speech therapy currently.  We will help schedule modified barium swallow.     9.  Anxiety and depression: He was referred to Dr. Espana in 2019. He is on clonazepam, Cymbalta, Zyprexa    10. Hallucinations: Improved, Dr. Becerra      11.  Vitamin B12 deficiency: No longer on supplement    12. Wellness: Colonoscopy was in January 2017 with history of polyps with Dr. Gould     Ciprofloxacin for 7 days for urinary tract infection    He was on hospice until 7/2022 when they revoked in order to pursue more aggressive care for Parkinsons.  [facility]     [facility]      Medicare Annual Wellness and Personalized Prevention Plan:   Fall Risk + Home Safety + Hearing Impairment + Depression Screen + Cognitive Impairment Screen + Health Risk Assessment all reviewed    Health Maintenance Topics with due status: Not Due       Topic Last Completion Date    TETANUS VACCINE 04/15/2020    Lipid Panel 11/09/2022    High Dose Statin 11/22/2023      The patient's Health Maintenance was reviewed and the following appears to be due at this time:   Health Maintenance Due   Topic Date Due    Hepatitis C Screening  Never done    Shingles Vaccine (1 of 2) Never done    RSV Vaccine (Age 60+ and Pregnant patients) (1 - 1-dose 60+ series) Never done    Pneumococcal Vaccines (Age 65+) (1 - PCV) Never done     Abdominal Aortic Aneurysm Screening  Never done    Colorectal Cancer Screening  01/24/2022    COVID-19 Vaccine (3 - 2023-24 season) 09/01/2023       Advance Care Planning   I attest to discussing Advance Care Planning with patient and/or family member.  Education was provided including the importance of the Health Care Power of , Advance Directives, and/or [place] documentation.  The patient expressed understanding to the importance of this information and discussion.  Length of ACP conversation in minutes: 1       Opioid Screening: Patient medication list reviewed, patient is not taking prescription opioids. Patient is not using additional opioids than prescribed. Patient is at low risk of substance abuse based on this opioid use history.     No follow-ups on file. In addition to their scheduled follow up, the patient has also been instructed to follow up on as needed basis.

## 2023-11-22 NOTE — TELEPHONE ENCOUNTER
----- Message from Sarah Kidd sent at 11/22/2023  8:18 AM CST -----  Regarding: Appt  .Type:  Needs Medical Advice    Who Called: Pt  Symptoms (please be specific):    How long has patient had these symptoms:    Pharmacy name and phone #:    Would the patient rather a call back or a response via [facility]? Call back  Best Call Back Number: [phone]  Additional Information: Pt's wife states that she is unable to get on for virtual visit at 1:00 today. Called the help desk for advice and wanted to speak with pt but he doesn't have a voice, would like [employer] to call before appt to figure out what she should do.

## 2023-11-27 ENCOUNTER — TELEPHONE (OUTPATIENT)
Dept: INTERNAL MEDICINE | Facility: CLINIC | Age: 73
End: 2023-11-27
Payer: MEDICARE

## 2023-11-27 DIAGNOSIS — R39.89 URINE TROUBLES: Primary | ICD-10-CM

## 2023-11-27 RX ORDER — CEFUROXIME AXETIL 500 MG/1
500 TABLET ORAL 2 TIMES DAILY
Qty: 20 TABLET | Refills: 0 | Status: SHIPPED | OUTPATIENT
Start: 2023-11-27 | End: 2023-12-07

## 2023-11-27 NOTE — TELEPHONE ENCOUNTER
----- Message from Tiffany Vega sent at 11/27/2023  2:54 PM CST -----  Regarding: medical advice  Type:  Needs Medical Advice    Who Called: Wilmar with [facility]    Would the patient rather a call back or a response via [facility]? Call back     Best Call Back Number: [phone]    Additional Information: wilmar is requesting a call back. Dr Moore prescribed a medication that Is not compatible with his illness

## 2023-12-06 ENCOUNTER — TELEPHONE (OUTPATIENT)
Dept: INTERNAL MEDICINE | Facility: CLINIC | Age: 73
End: 2023-12-06
Payer: MEDICARE

## 2023-12-06 DIAGNOSIS — G20.B1 PARKINSON'S DISEASE WITH DYSKINESIA, UNSPECIFIED WHETHER MANIFESTATIONS FLUCTUATE: Primary | ICD-10-CM

## 2023-12-06 DIAGNOSIS — R13.10 DYSPHAGIA, UNSPECIFIED TYPE: ICD-10-CM

## 2023-12-06 NOTE — TELEPHONE ENCOUNTER
----- Message from Jeffrey Cespedes sent at 12/6/2023  2:27 PM CST -----  .Type:  Needs Medical Advice    Who Called: [facility]    Symptoms (please be specific):    How long has patient had these symptoms:    Pharmacy name and phone #:    Would the patient rather a call back or a response via [facility]?   Best Call Back Number: [phone]  Additional Information:  Requested a call back from nurse to discuss information that was faxed over she would like a call back.

## 2023-12-06 NOTE — TELEPHONE ENCOUNTER
----- Message from Tiffany Vega sent at 12/6/2023  8:19 AM CST -----  Regarding: medical advice  Type:  Needs Medical Advice    Who Called: Latha     Symptoms (please be specific): Parkinson is getting worse he is not swallowing     How long has patient had these symptoms:      Pharmacy name and phone #:      Would the patient rather a call back or a response via [facility]? Call back     Best Call Back Number: [phone]    Additional Information: Latha is requesting a call back to discuss her  parkinson

## 2023-12-14 ENCOUNTER — DOCUMENT SCAN (OUTPATIENT)
Dept: HOME HEALTH SERVICES | Facility: HOSPITAL | Age: 73
End: 2023-12-14
Payer: MEDICARE

## 2023-12-20 ENCOUNTER — DOCUMENT SCAN (OUTPATIENT)
Dept: HOME HEALTH SERVICES | Facility: HOSPITAL | Age: 73
End: 2023-12-20
Payer: MEDICARE

## 2023-12-21 NOTE — PROGRESS NOTES
Subjective:       Patient ID: Buzz Fontenot is a 72 y.o. male.      Patient Care Team:  Sean Moore II, MD as PCP - General (Internal Medicine)  Sean Moore II, MD    Chief Complaint: Follow-up      72-year-old male here for followup of Parkinson's, psoriasis, BPH, GERD, hyperlipidemia, and hypertension among other conditions.       Review of Systems   Constitutional:  Negative for fever.   HENT:  Negative for nosebleeds.    Eyes:  Negative for visual disturbance.   Respiratory:  Negative for shortness of breath.    Cardiovascular:  Negative for chest pain.   Gastrointestinal:  Negative for abdominal pain.   Genitourinary:  Negative for dysuria.   Musculoskeletal:  Positive for gait problem.   Neurological:  Positive for dizziness, weakness and coordination difficulties. Negative for headaches.           Patient Reported Health Risk Assessment         Objective:      Physical Exam  HENT:      Head: Normocephalic.      Mouth/Throat:      Pharynx: Oropharynx is clear.   Eyes:      Extraocular Movements: Extraocular movements intact.   Cardiovascular:      Rate and Rhythm: Normal rate and regular rhythm.   Pulmonary:      Breath sounds: Normal breath sounds.   Abdominal:      Palpations: Abdomen is soft.   Skin:     General: Skin is warm.   Neurological:      Mental Status: He is alert and oriented to person, place, and time.      Gait: Gait abnormal.   Psychiatric:         Mood and Affect: Mood normal.         There were no vitals filed for this visit.                No data to display                  11/22/2023     1:00 PM 5/23/2023     1:15 PM 12/21/2022     2:30 PM 11/9/2022    10:00 AM 8/12/2022    10:00 AM   Fall Risk Assessment - Outpatient   Mobility Status Ambulatory Wheelchair Bound Ambulatory Wheelchair Bound Wheelchair Bound   Number of falls 0 0 0 0 2+   Identified as fall risk False True False True True                  Assessment:       Problem List Items Addressed This Visit          Neuro     Parkinson's disease - Primary       Psychiatric    Major depression in remission       Cardiac/Vascular    Hypertension    Hyperlipidemia    Aortic atherosclerosis       Oncology    Chronic anemia       Endocrine    Vitamin B12 deficiency       Other    RESOLVED: Wellness examination         Medication List with Changes/Refills   Current Medications    ALBUTEROL (PROAIR HFA) 90 MCG/ACTUATION INHALER    Inhale 2 puffs into the lungs every 6 (six) hours as needed for Wheezing. Rescue    ALBUTEROL (PROVENTIL) 2.5 MG /3 ML (0.083 %) NEBULIZER SOLUTION    USE 1 AMPULE (3 mLs (2.5 mg total)) by nebulization every 6 (six) hours AS NEEDED. Rescue    CIPROFLOXACIN HCL (CIPRO) 500 MG TABLET    Take 500 mg by mouth 2 (two) times daily.    CLONAZEPAM (KLONOPIN) 0.5 MG TABLET    Take 1 tablet (0.5 mg total) by mouth 2 (two) times daily.    CLONIDINE (CATAPRES) 0.1 MG TABLET    Take 1 tablet (0.1 mg total) by mouth every 6 (six) hours as needed (If systolic BP is greater than 180).    DULOXETINE (CYMBALTA) 30 MG CAPSULE    Take 30 mg by mouth every morning.    DULOXETINE (CYMBALTA) 60 MG CAPSULE    Take 60 mg by mouth every morning.    FLUDROCORTISONE (FLORINEF) 0.1 MG TAB    Take 100 mcg by mouth once daily.    HYOSCYAMINE (LEVSIN/SL) 0.125 MG SUBL    Take 1 tablet (0.125 mg total) by mouth daily as needed (Oral secretions).    KETOCONAZOLE (NIZORAL) 2 % SHAMPOO    Apply topically.    MIDODRINE (PROAMATINE) 5 MG TAB    Take 1 tablet (5 mg total) by mouth 2 (two) times daily.    OLANZAPINE ZYDIS (ZYPREXA) 5 MG TBDL    Take 1 tablet by mouth 2 (two) times a day.    PANTOPRAZOLE (PROTONIX) 40 MG TABLET    TAKE 1 TABLET BY MOUTH ONCE DAILY...    ROPINIROLE (REQUIP) 0.5 MG TABLET    Take 1 tablet (0.5 mg total) by mouth 2 (two) times daily.    ROSUVASTATIN (CRESTOR) 20 MG TABLET    TAKE 1 TABLET BY MOUTH ONCE DAILY...    TAMSULOSIN (FLOMAX) 0.4 MG CAP    Take by mouth once daily.    VITAMIN C 1000 MG TABLET    Take 0.5 tablets by  "mouth 2 (two) times a day.        Plan:       1. Psoriasis: He was followed by Dr. Kam     2. Mild coronary artery disease: Angiogram in 2013 showed "less than 30% lesion." No longer on aspirin, was followed by Dr. Leal     3. Benign prostatic hypertrophy: He is followed by Dr. Anglin. Flomax was discontinued because of syncope in 2018, now back on Flomax. He is incontinent. On Cipro for 3 weeks     4. Hypogonadism: He is no longer on testosterone replacement     5. Insomnia: On Zyprexa and clonazepam     6. Hyperlipidemia: LDL at goal     7. Hypertension: Amlodipine was discontinued because of hypotension.  Clonidine as needed     8. Parkinson's disease: Diagnosed in 2017. Multiple medications were discontinued while in the hospital in 2018, including Wellbutrin, Exelon patch, Flomax, midodrine. Followed by Dr. Becerra.  No longer on Sinemet.  He is back on tamsulosin and midodrine.  Hyoscyamine daily for excess oral secretions.  Because of dysphagia, peg tube was placed in 12/2023, continue tube feedings. Dr. Becerra added fludrocortisone     9.  Anxiety and depression: He was referred to Dr. Espana in 2019. He is on clonazepam, Cymbalta, Zyprexa     10. Hallucinations: Improved, Dr. Becerra following      11.  Vitamin B12 deficiency: No longer on supplement     12. Wellness: Colonoscopy was in January 2017 with history of polyps with Dr. Gould          He was on hospice until 7/2022 when they revoked in order to pursue more aggressive care for Parkinsons.  [facility]     [facility]      Transitional [facility] Note    Family and/or Caretaker present at visit?  Yes.  Diagnostic tests reviewed/disposition: No diagnosic tests pending after this hospitalization.  Disease/illness education: yes  Home health/community services discussion/referrals: Patient has home health established at [facility] .   Establishment or re-establishment of referral orders for community resources: No other necessary " community resources.   Discussion with other health care providers: No discussion with other health care providers necessary.                Medicare Annual Wellness and Personalized Prevention Plan:   Fall Risk + Home Safety + Hearing Impairment + Depression Screen + Cognitive Impairment Screen + Health Risk Assessment all reviewed    Health Maintenance Topics with due status: Not Due       Topic Last Completion Date    TETANUS VACCINE 04/15/2020    Lipid Panel 11/09/2022    High Dose Statin 11/22/2023      The patient's Health Maintenance was reviewed and the following appears to be due at this time:   Health Maintenance Due   Topic Date Due    Hepatitis C Screening  Never done    Shingles Vaccine (1 of 2) Never done    RSV Vaccine (Age 60+ and Pregnant patients) (1 - 1-dose 60+ series) Never done    Pneumococcal Vaccines (Age 65+) (1 - PCV) Never done    Abdominal Aortic Aneurysm Screening  Never done    Colorectal Cancer Screening  01/24/2022    COVID-19 Vaccine (3 - 2023-24 season) 09/01/2023       Advance Care Planning   I attest to discussing Advance Care Planning with patient and/or family member.  Education was provided including the importance of the Health Care Power of , Advance Directives, and/or [place] documentation.  The patient expressed understanding to the importance of this information and discussion.  Length of ACP conversation in minutes: 1       Opioid Screening: Patient medication list reviewed, patient is not taking prescription opioids. Patient is not using additional opioids than prescribed. Patient is at low risk of substance abuse based on this opioid use history.     No follow-ups on file. In addition to their scheduled follow up, the patient has also been instructed to follow up on as needed basis.

## 2023-12-22 ENCOUNTER — OFFICE VISIT (OUTPATIENT)
Dept: INTERNAL MEDICINE | Facility: CLINIC | Age: 73
End: 2023-12-22
Payer: MEDICARE

## 2023-12-22 ENCOUNTER — EXTERNAL HOME HEALTH (OUTPATIENT)
Dept: HOME HEALTH SERVICES | Facility: HOSPITAL | Age: 73
End: 2023-12-22
Payer: MEDICARE

## 2023-12-22 VITALS
BODY MASS INDEX: 22.73 KG/M2 | SYSTOLIC BLOOD PRESSURE: 120 MMHG | RESPIRATION RATE: 16 BRPM | OXYGEN SATURATION: 96 % | TEMPERATURE: 98 F | HEIGHT: 68 IN | HEART RATE: 60 BPM | DIASTOLIC BLOOD PRESSURE: 72 MMHG | WEIGHT: 150 LBS

## 2023-12-22 DIAGNOSIS — F32.5 MAJOR DEPRESSION IN REMISSION: ICD-10-CM

## 2023-12-22 DIAGNOSIS — E78.49 OTHER HYPERLIPIDEMIA: ICD-10-CM

## 2023-12-22 DIAGNOSIS — D64.9 CHRONIC ANEMIA: ICD-10-CM

## 2023-12-22 DIAGNOSIS — I70.0 AORTIC ATHEROSCLEROSIS: ICD-10-CM

## 2023-12-22 DIAGNOSIS — Z00.00 WELLNESS EXAMINATION: ICD-10-CM

## 2023-12-22 DIAGNOSIS — I10 PRIMARY HYPERTENSION: ICD-10-CM

## 2023-12-22 DIAGNOSIS — G20.A1 PARKINSON'S DISEASE, UNSPECIFIED WHETHER DYSKINESIA PRESENT, UNSPECIFIED WHETHER MANIFESTATIONS FLUCTUATE: Primary | ICD-10-CM

## 2023-12-22 DIAGNOSIS — E53.8 VITAMIN B12 DEFICIENCY: ICD-10-CM

## 2023-12-22 PROCEDURE — 99214 OFFICE O/P EST MOD 30 MIN: CPT | Mod: ,,, | Performed by: INTERNAL MEDICINE

## 2023-12-22 PROCEDURE — 99214 PR OFFICE/OUTPT VISIT, EST, LEVL IV, 30-39 MIN: ICD-10-PCS | Mod: ,,, | Performed by: INTERNAL MEDICINE

## 2023-12-22 RX ORDER — FLUDROCORTISONE ACETATE 0.1 MG/1
100 TABLET ORAL DAILY
COMMUNITY
Start: 2023-11-27

## 2023-12-22 RX ORDER — CIPROFLOXACIN 500 MG/1
500 TABLET ORAL 2 TIMES DAILY
COMMUNITY
Start: 2023-12-07

## 2023-12-27 ENCOUNTER — DOCUMENT SCAN (OUTPATIENT)
Dept: HOME HEALTH SERVICES | Facility: HOSPITAL | Age: 73
End: 2023-12-27
Payer: MEDICARE

## 2023-12-27 DIAGNOSIS — I10 PRIMARY HYPERTENSION: Primary | ICD-10-CM

## 2023-12-28 ENCOUNTER — LAB REQUISITION (OUTPATIENT)
Dept: LAB | Facility: HOSPITAL | Age: 73
End: 2023-12-28
Payer: MEDICARE

## 2023-12-28 DIAGNOSIS — N40.1 BENIGN PROSTATIC HYPERPLASIA WITH LOWER URINARY TRACT SYMPTOMS: ICD-10-CM

## 2023-12-28 LAB — PSA SERPL-MCNC: 3.18 NG/ML

## 2023-12-28 PROCEDURE — 84153 ASSAY OF PSA TOTAL: CPT

## 2023-12-28 RX ORDER — CLONIDINE HYDROCHLORIDE 0.1 MG/1
0.1 TABLET ORAL EVERY 6 HOURS PRN
Qty: 30 TABLET | Refills: 0 | Status: SHIPPED | OUTPATIENT
Start: 2023-12-28 | End: 2024-12-27

## 2024-01-02 ENCOUNTER — TELEPHONE (OUTPATIENT)
Dept: INTERNAL MEDICINE | Facility: CLINIC | Age: 74
End: 2024-01-02
Payer: MEDICARE

## 2024-01-02 RX ORDER — MELATONIN 10 MG
1 CAPSULE ORAL NIGHTLY
COMMUNITY

## 2024-01-02 RX ORDER — ROSUVASTATIN CALCIUM 20 MG/1
20 TABLET, COATED ORAL DAILY
COMMUNITY
Start: 2023-12-27 | End: 2024-02-26

## 2024-01-02 RX ORDER — BUPROPION HYDROCHLORIDE 150 MG/1
150 TABLET ORAL EVERY MORNING
COMMUNITY
Start: 2023-12-27

## 2024-01-02 NOTE — TELEPHONE ENCOUNTER
HH contacted us having issues with sleeping and being combative with sitters at night. Takes melatonin 10 mg at night. Anything else he can try for sleeping? Please advise.

## 2024-01-08 ENCOUNTER — TELEPHONE (OUTPATIENT)
Dept: INTERNAL MEDICINE | Facility: CLINIC | Age: 74
End: 2024-01-08
Payer: MEDICARE

## 2024-01-08 NOTE — TELEPHONE ENCOUNTER
Spoke with patient wife at this time. HH PT thinks she needs a head scan d/t orthostatic hypotension. Also wanting OT services?

## 2024-01-08 NOTE — TELEPHONE ENCOUNTER
Spoke with wilmar at this time with [facility]. OT too soon at this time as he was discharged in November. No need for head scan. Made wallace his wife aware as well.

## 2024-01-08 NOTE — TELEPHONE ENCOUNTER
----- Message from Jeffrey Cespedes sent at 1/8/2024  9:31 AM CST -----  .Type:  Needs Medical Advice    Who Called: Pt 's wife   Symptoms (please be specific):    How long has patient had these symptoms:    Pharmacy name and phone #:    Would the patient rather a call back or a response via [facility]?   Best Call Back Number: [phone]  Additional Information: Please call her back re: her  request from PT for him to have a head scan.  Also he needs to have OT sessions as well.

## 2024-01-11 ENCOUNTER — TELEPHONE (OUTPATIENT)
Dept: INTERNAL MEDICINE | Facility: CLINIC | Age: 74
End: 2024-01-11
Payer: MEDICARE

## 2024-01-11 NOTE — TELEPHONE ENCOUNTER
----- Message from Jeffrey Cespedes sent at 1/11/2024 12:03 PM CST -----  .Type:  Needs Medical Advice    Who Called: [facility]   Symptoms (please be specific):    How long has patient had these symptoms:    Pharmacy name and phone #:    Would the patient rather a call back or a response via [facility]?   Best Call Back Number: [phone]  Additional Information: Requested a call back from the nurse on this patient. She left message yesterday no one called her back.

## 2024-01-11 NOTE — TELEPHONE ENCOUNTER
Alayna nurse with [facility] called Dr. Zurita's office to get tube feedings adjusted because patient stated he was hungry, according to wife. Dr. Zurita's office is deferring to us to take over tube feeding recommendations.  Just giving you a heads up you may be getting orders to sign off on. Met with dietician and recommendation increase tube feedings jevity 1.5 240 ml 6 times day. Bolus feedings, etc.

## 2024-01-22 ENCOUNTER — TELEPHONE (OUTPATIENT)
Dept: INTERNAL MEDICINE | Facility: CLINIC | Age: 74
End: 2024-01-22
Payer: MEDICARE

## 2024-01-22 NOTE — TELEPHONE ENCOUNTER
----- Message from Evonne Yoon sent at 1/22/2024  9:51 AM CST -----  Regarding: advice  Type:  Needs Medical Advice    Who Called: pt wife wallace Castellano Call Back Number: [phone]  Additional Information: called needing to speak to evans about her  feeding tube as well as discuss therapy for the pt. Please advise

## 2024-01-22 NOTE — TELEPHONE ENCOUNTER
Advised she get with HH regarding if he can get more therapy she will reach out to them as I am unable to give a timeline as to when he can restart

## 2024-02-21 DIAGNOSIS — J40 BRONCHITIS: ICD-10-CM

## 2024-02-21 RX ORDER — ALBUTEROL SULFATE 0.83 MG/ML
SOLUTION RESPIRATORY (INHALATION)
Qty: 180 ML | Refills: 1 | Status: SHIPPED | OUTPATIENT
Start: 2024-02-21 | End: 2024-03-25

## 2024-02-25 RX ORDER — LEVOFLOXACIN 750 MG/1
750 TABLET ORAL DAILY
Qty: 5 TABLET | Refills: 0 | Status: SHIPPED | OUTPATIENT
Start: 2024-02-25 | End: 2024-02-26

## 2024-02-25 RX ORDER — BENZONATATE 100 MG/1
100 CAPSULE ORAL 3 TIMES DAILY PRN
Qty: 30 CAPSULE | Refills: 0 | Status: SHIPPED | OUTPATIENT
Start: 2024-02-25 | End: 2024-03-06

## 2024-02-26 ENCOUNTER — OFFICE VISIT (OUTPATIENT)
Dept: INTERNAL MEDICINE | Facility: CLINIC | Age: 74
End: 2024-02-26
Payer: MEDICARE

## 2024-02-26 VITALS
DIASTOLIC BLOOD PRESSURE: 72 MMHG | HEART RATE: 66 BPM | WEIGHT: 150 LBS | HEIGHT: 68 IN | RESPIRATION RATE: 16 BRPM | SYSTOLIC BLOOD PRESSURE: 112 MMHG | BODY MASS INDEX: 22.73 KG/M2 | OXYGEN SATURATION: 99 %

## 2024-02-26 DIAGNOSIS — I10 PRIMARY HYPERTENSION: ICD-10-CM

## 2024-02-26 DIAGNOSIS — F32.5 MAJOR DEPRESSION IN REMISSION: ICD-10-CM

## 2024-02-26 DIAGNOSIS — J06.9 UPPER RESPIRATORY TRACT INFECTION, UNSPECIFIED TYPE: ICD-10-CM

## 2024-02-26 DIAGNOSIS — G20.B2 PARKINSON'S DISEASE WITH DYSKINESIA AND FLUCTUATING MANIFESTATIONS: Primary | ICD-10-CM

## 2024-02-26 DIAGNOSIS — Z00.00 WELLNESS EXAMINATION: ICD-10-CM

## 2024-02-26 DIAGNOSIS — I95.1 ORTHOSTATIC HYPOTENSION: ICD-10-CM

## 2024-02-26 DIAGNOSIS — D64.9 CHRONIC ANEMIA: ICD-10-CM

## 2024-02-26 DIAGNOSIS — E78.49 OTHER HYPERLIPIDEMIA: ICD-10-CM

## 2024-02-26 DIAGNOSIS — E53.8 VITAMIN B12 DEFICIENCY: ICD-10-CM

## 2024-02-26 DIAGNOSIS — I70.0 AORTIC ATHEROSCLEROSIS: ICD-10-CM

## 2024-02-26 LAB
FLUAV AG UPPER RESP QL IA.RAPID: NOT DETECTED
FLUBV AG UPPER RESP QL IA.RAPID: NOT DETECTED
RSV A 5' UTR RNA NPH QL NAA+PROBE: NOT DETECTED
SARS-COV-2 RNA RESP QL NAA+PROBE: NOT DETECTED

## 2024-02-26 PROCEDURE — 0241U COVID/RSV/FLU A&B PCR: CPT | Performed by: INTERNAL MEDICINE

## 2024-02-26 PROCEDURE — 99214 OFFICE O/P EST MOD 30 MIN: CPT | Mod: ,,, | Performed by: INTERNAL MEDICINE

## 2024-02-26 RX ORDER — CITALOPRAM 20 MG/1
20 TABLET, FILM COATED ORAL DAILY
COMMUNITY
Start: 2024-02-07

## 2024-02-26 RX ORDER — LEVOFLOXACIN 750 MG/1
750 TABLET ORAL DAILY
COMMUNITY

## 2024-02-28 ENCOUNTER — TELEPHONE (OUTPATIENT)
Dept: INTERNAL MEDICINE | Facility: CLINIC | Age: 74
End: 2024-02-28
Payer: MEDICARE

## 2024-02-28 NOTE — TELEPHONE ENCOUNTER
----- Message from [facility] sent at 2/28/2024  1:53 PM CST -----  .Type:  Needs Medical Advice    Who Called:  [facility] ( [facility])    Symptoms (please be specific):  no     How long has patient had these symptoms:   no    Pharmacy name and phone #:   no    Would the patient rather a call back or a response via [facility]?      Best Call Back Number:  [phone] or fax# [phone]    Additional Information: they need  chart notes  to document dysphagia and need for suction pump please advise thanks

## 2024-03-11 ENCOUNTER — TELEPHONE (OUTPATIENT)
Dept: INTERNAL MEDICINE | Facility: CLINIC | Age: 74
End: 2024-03-11
Payer: MEDICARE

## 2024-03-11 DIAGNOSIS — R05.9 COUGH, UNSPECIFIED TYPE: Primary | ICD-10-CM

## 2024-03-11 RX ORDER — BENZONATATE 100 MG/1
100 CAPSULE ORAL 3 TIMES DAILY PRN
Qty: 30 CAPSULE | Refills: 0 | Status: SHIPPED | OUTPATIENT
Start: 2024-03-11 | End: 2024-03-21

## 2024-03-11 NOTE — TELEPHONE ENCOUNTER
----- Message from Tobi Amaral sent at 3/11/2024  9:41 AM CDT -----  .Type:  Patient Returning Call    Who Called:pt's wife   Who Left Message for Patient:  Does the patient know what this is regarding?:medication error   Would the patient rather a call back or a response via [facility]? Call back   Best Call Back Number:[phone]  Additional Information: Pt's wife states that the pt needs benzonatate (TESSALON) 100 MG capsule in a capsule form  that can either be opened or a pill that can be crushed because the pt has a peg tube.

## 2024-03-11 NOTE — TELEPHONE ENCOUNTER
----- Message from Maria Ines Davis sent at 3/11/2024  8:41 AM CDT -----  .Type:  Needs Medical Advice    Who Called: pt  Symptoms (please be specific):    How long has patient had these symptoms:    Pharmacy name and phone #:    Would the patient rather a call back or a response via [facility]?   Best Call Back Number: [phone]  Additional Information: pt wife called for a medication that he needs benzonatate

## 2024-03-11 NOTE — TELEPHONE ENCOUNTER
Spoke with wallace at this time he cannot swallow tessalon gel capsules. Recommended mucinex cough syrup instead to take via peg tube ROBB

## 2024-03-11 NOTE — TELEPHONE ENCOUNTER
----- Message from Tobi Amaral sent at 3/11/2024  9:41 AM CDT -----  .Type:  Patient Returning Call    Who Called:pt's wife   Who Left Message for Patient:  Does the patient know what this is regarding?:medication error   Would the patient rather a call back or a response via [facility]? Call back   Best Call Back Number:[phone]  Additional Information: Pt's wife states that the pt needs benzonatate (TESSALON) 100 MG capsule in a capsule form  that can either be opened or a pill that can be crushed because the pt has a peg tube.

## 2024-03-25 DIAGNOSIS — J40 BRONCHITIS: ICD-10-CM

## 2024-03-25 RX ORDER — ALBUTEROL SULFATE 0.83 MG/ML
SOLUTION RESPIRATORY (INHALATION)
Qty: 180 ML | Refills: 1 | Status: SHIPPED | OUTPATIENT
Start: 2024-03-25 | End: 2024-05-02

## 2024-04-19 ENCOUNTER — TELEPHONE (OUTPATIENT)
Dept: INTERNAL MEDICINE | Facility: CLINIC | Age: 74
End: 2024-04-19
Payer: MEDICARE

## 2024-04-19 DIAGNOSIS — Z00.00 WELLNESS EXAMINATION: ICD-10-CM

## 2024-04-22 ENCOUNTER — TELEPHONE (OUTPATIENT)
Dept: INTERNAL MEDICINE | Facility: CLINIC | Age: 74
End: 2024-04-22
Payer: MEDICARE

## 2024-04-22 RX ORDER — ROPINIROLE 0.5 MG/1
0.5 TABLET, FILM COATED ORAL 2 TIMES DAILY
Qty: 60 TABLET | Refills: 5 | Status: SHIPPED | OUTPATIENT
Start: 2024-04-22

## 2024-04-22 NOTE — TELEPHONE ENCOUNTER
----- Message from Maria Ines Davis sent at 4/19/2024 12:21 PM CDT -----    ----- Message -----  From: Justyna Travis MA  Sent: 4/19/2024   9:54 AM CDT  To: Maria Ines Davis    Goes to Dr. Moore  ----- Message -----  From: Maria Ines Davis  Sent: 4/19/2024   9:50 AM CDT  To: Satish CHAU Staff    .Type:  Needs Medical Advice    Who Called: pt wife  Symptoms (please be specific): coughing up yellow mucus   How long has patient had these symptoms:    Pharmacy name and phone #:    Would the patient rather a call back or a response via [facility]?   Best Call Back Number: [phone]  Additional Information: wife asking for abx for her

## 2024-05-02 DIAGNOSIS — J40 BRONCHITIS: ICD-10-CM

## 2024-05-02 RX ORDER — ALBUTEROL SULFATE 0.83 MG/ML
SOLUTION RESPIRATORY (INHALATION)
Qty: 180 ML | Refills: 1 | Status: SHIPPED | OUTPATIENT
Start: 2024-05-02 | End: 2024-05-06 | Stop reason: SDUPTHER

## 2024-05-06 ENCOUNTER — TELEPHONE (OUTPATIENT)
Dept: INTERNAL MEDICINE | Facility: CLINIC | Age: 74
End: 2024-05-06
Payer: MEDICARE

## 2024-05-06 DIAGNOSIS — J40 BRONCHITIS: ICD-10-CM

## 2024-05-06 DIAGNOSIS — Z00.00 WELLNESS EXAMINATION: Primary | ICD-10-CM

## 2024-05-06 RX ORDER — KETOCONAZOLE 20 MG/ML
SHAMPOO, SUSPENSION TOPICAL
Qty: 120 ML | Refills: 0 | Status: SHIPPED | OUTPATIENT
Start: 2024-05-06

## 2024-05-06 RX ORDER — ALBUTEROL SULFATE 0.83 MG/ML
2.5 SOLUTION RESPIRATORY (INHALATION) 3 TIMES DAILY PRN
Qty: 180 ML | Refills: 1 | Status: SHIPPED | OUTPATIENT
Start: 2024-05-06 | End: 2025-05-06

## 2024-05-06 NOTE — TELEPHONE ENCOUNTER
Increase to 3 times a day as needed, but let her know this can worsen tremors.  Only use as needed

## 2024-05-06 NOTE — TELEPHONE ENCOUNTER
----- Message from Yasir Basilio sent at 5/6/2024  1:10 PM CDT -----  .Who Called: Buzz Fontenot    Caller is requesting assistance/information from provider's office.    Preferred Method of Contact: Phone Call  Patient's Preferred Phone Number on File: [phone]   Best Call Back Number, if different:  Additional Information: pt's wife called asking to speak with nurse to discuss medication prescription albuterol (PROVENTIL) 2.5 mg /3 mL (0.083 %) nebulizer solution. Calling to discuss changing direction and amount.

## 2024-05-06 NOTE — TELEPHONE ENCOUNTER
Ok to increase nebulizer to more than bid? Have to use sometimes 3-4 times a day. Not sure if it was ordered BID for a reason.

## 2024-05-16 ENCOUNTER — TELEPHONE (OUTPATIENT)
Dept: ADMINISTRATIVE | Facility: HOSPITAL | Age: 74
End: 2024-05-16
Payer: MEDICARE

## 2024-05-20 ENCOUNTER — TELEPHONE (OUTPATIENT)
Dept: INTERNAL MEDICINE | Facility: CLINIC | Age: 74
End: 2024-05-20
Payer: MEDICARE

## 2024-05-20 NOTE — TELEPHONE ENCOUNTER
Please add to last note:   Patient is here for an evaluation of a power wheelchair. Patient has a history of diagnosis aforementioned and due to functional deficits related to this diagnosis, patient is unable to use a manual wheelchair or scooter to safely access the home environment, work, community and requires the use of a custom power wheelchair with specialized seating system. Patient and caregivers are willing and competent in the use of a power wheelchair. Patient's home is accessible for the power mobility device. Patient is dependent for either effective weight shifts or pressure redistribution and the prevention to pressure ulcers.

## 2024-05-20 NOTE — TELEPHONE ENCOUNTER
----- Message from Karolina Owens sent at 5/20/2024  2:12 PM CDT -----  Regarding: advice  Type:  Needs Medical Advice    Who Called: pt's wife wallace    Would the patient rather a call back or a response via [facility]? C/b  Best Call Back Number: [phone]    Additional Information: pt stated  [facility] is sending fax to order a wheelchair and pcp needs to sign off  Ishmael Giordano tel # [phone] is the contact at [employer] [place]  Fax# [phone]

## 2024-05-21 ENCOUNTER — TELEPHONE (OUTPATIENT)
Dept: INTERNAL MEDICINE | Facility: CLINIC | Age: 74
End: 2024-05-21
Payer: MEDICARE

## 2024-05-21 NOTE — TELEPHONE ENCOUNTER
----- Message from Karolina Owens sent at 5/21/2024 11:47 AM CDT -----  Regarding: advice  Type:  Needs Medical Advice    Who Called: pt's wife wallace  Would the patient rather a call back or a response via [facility]? C/b  Best Call Back Number: [phone]    Additional Information: was completed paperwork sent to medicare????  Please contact wallace

## 2024-05-23 DIAGNOSIS — Z00.00 WELLNESS EXAMINATION: Primary | ICD-10-CM

## 2024-05-24 RX ORDER — TAMSULOSIN HYDROCHLORIDE 0.4 MG/1
CAPSULE ORAL
Qty: 30 CAPSULE | Refills: 6 | Status: SHIPPED | OUTPATIENT
Start: 2024-05-24

## 2024-05-24 RX ORDER — FLUDROCORTISONE ACETATE 0.1 MG/1
100 TABLET ORAL
Qty: 30 TABLET | Refills: 5 | Status: SHIPPED | OUTPATIENT
Start: 2024-05-24

## 2024-06-03 ENCOUNTER — TELEPHONE (OUTPATIENT)
Dept: INTERNAL MEDICINE | Facility: CLINIC | Age: 74
End: 2024-06-03
Payer: MEDICARE

## 2024-06-03 ENCOUNTER — TELEPHONE (OUTPATIENT)
Dept: ADMINISTRATIVE | Facility: HOSPITAL | Age: 74
End: 2024-06-03
Payer: MEDICARE

## 2024-06-03 ENCOUNTER — LAB REQUISITION (OUTPATIENT)
Dept: LAB | Facility: HOSPITAL | Age: 74
End: 2024-06-03
Payer: MEDICARE

## 2024-06-03 DIAGNOSIS — R05.9 COUGH, UNSPECIFIED TYPE: ICD-10-CM

## 2024-06-03 DIAGNOSIS — G20.A1 PARKINSON'S DISEASE WITHOUT DYSKINESIA, WITHOUT MENTION OF FLUCTUATIONS: ICD-10-CM

## 2024-06-03 DIAGNOSIS — J06.9 UPPER RESPIRATORY TRACT INFECTION, UNSPECIFIED TYPE: Primary | ICD-10-CM

## 2024-06-03 DIAGNOSIS — J69.0 ASPIRATION PNEUMONIA, UNSPECIFIED ASPIRATION PNEUMONIA TYPE, UNSPECIFIED LATERALITY, UNSPECIFIED PART OF LUNG: Primary | ICD-10-CM

## 2024-06-03 LAB — PSA SERPL-MCNC: 2.34 NG/ML

## 2024-06-03 PROCEDURE — 84153 ASSAY OF PSA TOTAL: CPT | Performed by: SPECIALIST

## 2024-06-03 RX ORDER — AZITHROMYCIN 500 MG/1
500 TABLET, FILM COATED ORAL DAILY
Qty: 5 TABLET | Refills: 0 | Status: SHIPPED | OUTPATIENT
Start: 2024-06-03 | End: 2024-06-08

## 2024-06-03 RX ORDER — METHYLPREDNISOLONE 4 MG/1
TABLET ORAL
Qty: 21 EACH | Refills: 0 | Status: SHIPPED | OUTPATIENT
Start: 2024-06-03 | End: 2024-06-24

## 2024-06-03 NOTE — TELEPHONE ENCOUNTER
----- Message from Sean Moore II, MD sent at 6/3/2024 12:10 PM CDT -----  He is allergic to penicillin, so we can use azithromycin 500 mg daily for 5 days and Medrol Dosepak  ----- Message -----  From: Tunde Mitchell MA  Sent: 6/3/2024  11:44 AM CDT  To: Sean Moore II, MD    Please advise  ----- Message -----  From: Jeffrey Cespedes  Sent: 6/3/2024   9:08 AM CDT  To: Teresa LABOY II Staff    .Type:  Needs Medical Advice    Who Called: Latha  Symptoms (please be specific):  Coughing, spewing up mucus, congestation, slight fever they were giving him breathing treatment.    How long has patient had these symptoms:  Since Friday     Pharmacy name and phone #:  [facility]  Would the patient rather a call back or a response via [facility]?   Best Call Back Number: [phone]  Additional Information:  She requested to speak with the nurse about getting an antibodies for him.  She told me he is too weak to come into the office.

## 2024-06-03 NOTE — TELEPHONE ENCOUNTER
I spoke with Sheryl with [facility], she stated the patient has a bad cough, yellow mucus, and nasal congestion.  The patient's wife asked if he could get a chest x-ray ordered?It hurts when it takes deep breathes . The patient had a low grade fever yesterday.. This morning, the patient vitals were good.  Please advise?   HR 74  O2 96  Temp 99.7  B/p 120/78

## 2024-06-03 NOTE — TELEPHONE ENCOUNTER
Okay for chest x-ray.  I sent someone else a message earlier about starting him on antibiotics also

## 2024-06-04 ENCOUNTER — TELEPHONE (OUTPATIENT)
Dept: INTERNAL MEDICINE | Facility: CLINIC | Age: 74
End: 2024-06-04
Payer: MEDICARE

## 2024-06-04 NOTE — TELEPHONE ENCOUNTER
Spoke to [facility], stated ok, Pt wife asking if his tube feedings could be reduced, was increased to 70ml/h due to stating he was hungry, pt weight went from 147 to 163 in 7 months, wife stated 163 is his normal, would like to reduce Jevity 1.5 to 65ml/h, please advise

## 2024-06-04 NOTE — TELEPHONE ENCOUNTER
----- Message from Leonie Gonzáles sent at 6/4/2024 12:28 PM CDT -----  .Who Called: wilmar Gustafsonce [facility].   Caller is requesting assistance/information from provider's office.    Symptoms (please be specific): n/a   How long has patient had these symptoms:  n/a  List of preferred pharmacies on file (remove unneeded): [unfilled]  If different, enter pharmacy into here including location and phone number: n/a      Preferred Method of Contact: Phone Call  Patient's Preferred Phone Number on File:   Best Call Back Number, if different: [phone]  Additional Information: Called to check to find out if the xray results had been received and she stated that the results came back as normal from this morning. Caller also stated that she spoke with the pts wife and he is doing much better than yesterday.  Please advise at [phone]

## 2024-06-04 NOTE — TELEPHONE ENCOUNTER
It was not completely clear from the report, but chest x-ray showed possible pneumonia.  We already started him on antibiotics yesterday, so this should be sufficient

## 2024-06-05 NOTE — TELEPHONE ENCOUNTER
----- Message from Connie Lisa sent at 6/5/2024  8:15 AM CDT -----  .Type:  Patient Returning Call    Who Called:Alayna with [facility]  Who Left Message for Patient:Alayna  Does the patient know what this is regarding?:call back  Would the patient rather a call back or a response via [facility]?   Best Call Back Number:[phone]  Additional Information: Please call back asking for Pat

## 2024-06-17 ENCOUNTER — TELEPHONE (OUTPATIENT)
Dept: INTERNAL MEDICINE | Facility: CLINIC | Age: 74
End: 2024-06-17
Payer: MEDICARE

## 2024-06-17 NOTE — TELEPHONE ENCOUNTER
----- Message from Tobi Amaral sent at 6/17/2024 10:03 AM CDT -----  .Type:  Patient Returning Call    [facility]   Who Left Message for Patient:  Does the patient know what this is regarding?:Can the lab orders be fax to the  office . Fax [phone]  Would the patient rather a call back or a response via [facility]? Call back   Best Call Back Number:[phone]  Additional Information:

## 2024-06-17 NOTE — TELEPHONE ENCOUNTER
1. Are there any outstanding tasks in the patient's chart? Yes, fasting labs    2. Is there any documentation in the chart? No, labs needed.

## 2024-06-18 LAB
ALBUMIN SERPL BCP-MCNC: 3.6 G/DL
ALP SERPL-CCNC: 103 U/L (ref 25–125)
ALT SERPL-CCNC: 13 U/L
APPEARANCE URINE: CLEAR
AST SERPL-CCNC: 14 U/L
BASOPHILS - ABSOLUTE #: 0 /ΜL
BASOPHILS NFR BLD: 1 % (ref 0–3)
BILIRUB SERPL-MCNC: 0.5 MG/DL
BILIRUBIN URINE: NEGATIVE
BUN SERPL-MCNC: 24 MG/DL
BUN/CREAT RATIO: 24
CALCIUM SERPL-MCNC: 8.9 MG/DL
CHLORIDE SERPL-SCNC: 105 MMOL/L (ref 99–108)
CHOLEST SERPL-MSCNC: 149 MG/DL (ref 0–200)
CO2 SERPL-SCNC: 25 MMOL/L
CREAT SERPL-MCNC: 1 MG/DL
CRYSTALS: NEGATIVE
EOSINOPHIL NFR BLD AUTO: 3 %
EOSINOPHILS - ABSOLUTE #: 0 /ΜL
EPITHELIAL CELLS: NEGATIVE
ERYTHROCYTE [DISTWIDTH] IN BLOOD BY AUTOMATED COUNT: 14 %
EST. GFR  (NON AFRICAN AMERICAN): 82 ML/MIN/1.73 M2
EXT URINE BACTERIA: NEGATIVE
EXT URINE KETONES: NEGATIVE
EXT URINE OCCULT BLOOD: NEGATIVE
GLOBULIN SER-MCNC: 2.3 G/DL
GLUCOSE: 98
HCT VFR BLD AUTO: 38 %
HDLC SERPL-MCNC: 35 MG/DL
HGB BLD-MCNC: 12.4 G/DL
IMM GRANULOCYTES NFR BLD: 1 %
LDL CHOLESTEROL DIRECT: 98 MG/DL
LEUKOCYTE ESTERASE URINE, POC: NEGATIVE
LYMPHOCYTES ABSOLUTE COUNT: 2 /?L
LYMPHOCYTES NFR BLD AUTO: 25 %
MCH RBC QN AUTO: 30.3 PG
MCHC RBC AUTO-ENTMCNC: 32.6 G/DL
MCV RBC AUTO: 93 FL
MONOCYTES # BLD AUTO: 0.6 K/UL
MONOCYTES NFR BLD AUTO: 9 %
NEUTROPHILS # BLD AUTO: 4.2 K/UL
NEUTROPHILS NFR BLD AUTO: 61 %
NITRITE UR QL STRIP: NEGATIVE
PH, URINE: 7.5
PLATELET # BLD AUTO: 196 X10(3)/MCL (ref 130–400)
POTASSIUM, SERUM: 4.7 MMOL/L
PROTEIN URINE: NEGATIVE
RBC # BLD AUTO: 4.09 M/UL
RBC, URINE: NEGATIVE
SODIUM: 142 MMOL/L
SP GR UR STRIP: 1.02
TOTAL PROTEIN: 6.2 G/DL (ref 6.4–8.2)
TRIGL SERPL-MCNC: 84 MG/DL
TSH SERPL-ACNC: 0.56 UIU/ML
URINE GLUCOSE: NEGATIVE
URINE-COLOR: YELLOW
UROBILINOGEN UA: 1
VLDLC SERPL-MCNC: 16 MG/DL
WBC # BLD AUTO: 6.7 K/UL
WBC URINE: NORMAL

## 2024-06-20 ENCOUNTER — DOCUMENTATION ONLY (OUTPATIENT)
Dept: INTERNAL MEDICINE | Facility: CLINIC | Age: 74
End: 2024-06-20
Payer: MEDICARE

## 2024-06-24 ENCOUNTER — OFFICE VISIT (OUTPATIENT)
Dept: INTERNAL MEDICINE | Facility: CLINIC | Age: 74
End: 2024-06-24
Payer: MEDICARE

## 2024-06-24 VITALS
BODY MASS INDEX: 24.48 KG/M2 | HEIGHT: 68 IN | DIASTOLIC BLOOD PRESSURE: 70 MMHG | OXYGEN SATURATION: 98 % | RESPIRATION RATE: 16 BRPM | HEART RATE: 62 BPM | WEIGHT: 161.5 LBS | TEMPERATURE: 98 F | SYSTOLIC BLOOD PRESSURE: 112 MMHG

## 2024-06-24 DIAGNOSIS — Z00.00 WELLNESS EXAMINATION: ICD-10-CM

## 2024-06-24 DIAGNOSIS — I95.1 ORTHOSTATIC HYPOTENSION: ICD-10-CM

## 2024-06-24 DIAGNOSIS — E53.8 VITAMIN B12 DEFICIENCY: ICD-10-CM

## 2024-06-24 DIAGNOSIS — R55 SYNCOPE AND COLLAPSE: ICD-10-CM

## 2024-06-24 DIAGNOSIS — E78.49 OTHER HYPERLIPIDEMIA: ICD-10-CM

## 2024-06-24 DIAGNOSIS — I10 PRIMARY HYPERTENSION: ICD-10-CM

## 2024-06-24 DIAGNOSIS — I70.0 AORTIC ATHEROSCLEROSIS: ICD-10-CM

## 2024-06-24 DIAGNOSIS — F32.5 MAJOR DEPRESSION IN REMISSION: ICD-10-CM

## 2024-06-24 DIAGNOSIS — D64.9 CHRONIC ANEMIA: ICD-10-CM

## 2024-06-24 DIAGNOSIS — G20.B1 PARKINSON'S DISEASE WITH DYSKINESIA, UNSPECIFIED WHETHER MANIFESTATIONS FLUCTUATE: Primary | ICD-10-CM

## 2024-06-24 PROCEDURE — 99214 OFFICE O/P EST MOD 30 MIN: CPT | Mod: ,,, | Performed by: INTERNAL MEDICINE

## 2024-06-24 RX ORDER — ESCITALOPRAM OXALATE 5 MG/5ML
30 SOLUTION ORAL DAILY
COMMUNITY

## 2024-06-24 RX ORDER — OLANZAPINE 5 MG/1
15 TABLET, ORALLY DISINTEGRATING ORAL NIGHTLY
Qty: 90 TABLET | Refills: 11 | Status: SHIPPED | OUTPATIENT
Start: 2024-06-24 | End: 2025-06-19

## 2024-06-24 RX ORDER — GLYCOPYRROLATE 1 MG/1
1 TABLET ORAL 3 TIMES DAILY
COMMUNITY

## 2024-06-24 NOTE — PROGRESS NOTES
"Subjective:       Patient ID: Buzz Fontenot is a 73 y.o. male.      Patient Care Team:  Sean Moore II, MD as PCP - General (Internal Medicine)  Sean Moore II, MD    Chief Complaint: Tremors, Anemia, Hypertension, and Hyperlipidemia      73-year-old male here for followup of Parkinson's, psoriasis, BPH, GERD, hyperlipidemia, and hypertension among other conditions.  His wife and sitter are both with patient today.  They report that he has had more trouble staying asleep over the past few weeks, with more hallucinations      Review of Systems   Constitutional:  Negative for fever.   HENT:  Negative for nosebleeds.    Eyes:  Negative for visual disturbance.   Respiratory:  Negative for shortness of breath.    Cardiovascular:  Negative for chest pain.   Gastrointestinal:  Negative for abdominal pain.   Genitourinary:  Negative for dysuria.   Musculoskeletal:  Positive for gait problem.   Neurological:  Positive for dizziness, weakness and coordination difficulties. Negative for headaches.           Patient Reported Health Risk Assessment         Objective:      Physical Exam  HENT:      Head: Normocephalic.      Mouth/Throat:      Pharynx: Oropharynx is clear.   Eyes:      Extraocular Movements: Extraocular movements intact.   Cardiovascular:      Rate and Rhythm: Normal rate and regular rhythm.   Pulmonary:      Breath sounds: Normal breath sounds.   Abdominal:      Palpations: Abdomen is soft.   Skin:     General: Skin is warm.   Neurological:      Mental Status: He is alert and oriented to person, place, and time.      Gait: Gait abnormal.   Psychiatric:         Mood and Affect: Mood normal.       Vitals:    06/24/24 1037   BP: 112/70   Pulse: 62   Resp: 16   Temp: 97.8 °F (36.6 °C)   SpO2: 98%   Weight: 73.3 kg (161 lb 8 oz)   Height: 5' 8" (1.727 m)                     No data to display                  6/24/2024    10:15 AM 2/26/2024     9:00 AM 12/22/2023    10:30 AM 11/22/2023     1:00 PM " 5/23/2023     1:15 PM 12/21/2022     2:30 PM 11/9/2022    10:00 AM   Fall Risk Assessment - Outpatient   Mobility Status Wheelchair Bound Wheelchair Bound Ambulatory Ambulatory Wheelchair Bound Ambulatory Wheelchair Bound   Number of falls 0 0 0 0 0 0 0   Identified as fall risk True True False False True False True                  Assessment:       Problem List Items Addressed This Visit          Neuro    Parkinson's disease - Primary       Psychiatric    Major depression in remission       Cardiac/Vascular    Hypertension    Hyperlipidemia    Orthostatic hypotension    Aortic atherosclerosis       Oncology    Chronic anemia       Endocrine    Vitamin B12 deficiency       Other    Syncope and collapse    RESOLVED: Wellness examination         Medication List with Changes/Refills   Current Medications    ALBUTEROL (PROAIR HFA) 90 MCG/ACTUATION INHALER    Inhale 2 puffs into the lungs every 6 (six) hours as needed for Wheezing. Rescue    ALBUTEROL (PROVENTIL) 2.5 MG /3 ML (0.083 %) NEBULIZER SOLUTION    Take 3 mLs (2.5 mg total) by nebulization 3 (three) times daily as needed for Wheezing. Rescue    CLONAZEPAM (KLONOPIN) 0.5 MG TABLET    Take 1 tablet (0.5 mg total) by mouth 2 (two) times daily.    CLONIDINE (CATAPRES) 0.1 MG TABLET    Take 1 tablet (0.1 mg total) by mouth every 6 (six) hours as needed (If systolic BP is greater than 180).    ESCITALOPRAM OXALATE (LEXAPRO) 5 MG/5 ML SOLN    Take 30 mLs by mouth once daily.    FLUDROCORTISONE (FLORINEF) 0.1 MG TAB    TAKE ONE TABLET BY MOUTH EVERY DAY    GLYCOPYRROLATE (ROBINUL) 1 MG TAB    Take 1 mg by mouth 3 (three) times daily.    MELATONIN 10 MG CAP    Take 1 capsule by mouth every evening.    MIDODRINE (PROAMATINE) 5 MG TAB    Take 1 tablet (5 mg total) by mouth 2 (two) times daily.    OLANZAPINE ZYDIS (ZYPREXA) 5 MG TBDL    Take 10 mg by mouth every evening.    PANTOPRAZOLE (PROTONIX) 40 MG TABLET    TAKE 1 TABLET BY MOUTH ONCE DAILY...    ROPINIROLE (REQUIP)  "0.5 MG TABLET    Take 1 tablet (0.5 mg total) by mouth 2 (two) times daily.    TAMSULOSIN (FLOMAX) 0.4 MG CAP    TAKE 1 CAPSULE BY MOUTH DAILY AT BEDTIME...   Discontinued Medications    BUPROPION (WELLBUTRIN XL) 150 MG TB24 TABLET    Take 150 mg by mouth every morning.    CIPROFLOXACIN HCL (CIPRO) 500 MG TABLET    Take 500 mg by mouth 2 (two) times daily.    CITALOPRAM (CELEXA) 20 MG TABLET    Take 20 mg by mouth once daily.    DULOXETINE (CYMBALTA) 30 MG CAPSULE    Take 30 mg by mouth every morning.    DULOXETINE (CYMBALTA) 60 MG CAPSULE    Take 60 mg by mouth every morning.    HYOSCYAMINE (LEVSIN/SL) 0.125 MG SUBL    Take 1 tablet (0.125 mg total) by mouth daily as needed (Oral secretions).    KETOCONAZOLE (NIZORAL) 2 % SHAMPOO    SHAMPOO HAIR EVERY OTHER DAY (AS DIRECTED). LATHER & LET SIT FOR 3-5 MINUTES THEN RINSE.    LEVOFLOXACIN (LEVAQUIN) 750 MG TABLET    Take 750 mg by mouth once daily.    METHYLPREDNISOLONE (MEDROL DOSEPACK) 4 MG TABLET    use as directed        Plan:       1. Psoriasis: He was followed by Dr. Kam     2. Mild coronary artery disease: Angiogram in 2013 showed "less than 30% lesion." No longer on aspirin, was followed by Dr. Leal     3. Benign prostatic hypertrophy: He is followed by Dr. Anglin. Flomax was discontinued because of syncope in 2018, now back on Flomax. He is incontinent.      4. Hypogonadism: He is no longer on testosterone replacement     5. Insomnia: On Zyprexa and clonazepam.  Increase Zyprexa from 10 mg to 15 mg nightly     6. Hyperlipidemia: LDL at goal     7. Hypertension: Amlodipine was discontinued because of hypotension.  Clonidine as needed     8. Parkinson's disease: Diagnosed in 2017. Multiple medications were discontinued while in the hospital in 2018, including Wellbutrin, Exelon patch, Flomax, midodrine. Followed by Dr. Becerra.  No longer on Sinemet.  He is back on tamsulosin and midodrine.  Robinul for excess oral secretions.  Because of dysphagia, peg " tube was placed in 12/2023, continue tube feedings. Dr. Becerra added fludrocortisone, which is working well.  Oropharyngeal dysphagia, PEG PLACED IN 2023, only using this for nutrition.  He needs suction device     9.  Anxiety and depression: He was referred to Dr. Espana in 2019. He is on clonazepam, Cymbalta, Zyprexa     10. Hallucinations: Improved, Dr. Becerra following      11.  Vitamin B12 deficiency: No longer on supplement     12. Wellness: Colonoscopy was in January 2017 with history of polyps with Dr. Gould. [facility]          He was on hospice until 2022 when they revoked in order to pursue more aggressive care for Parkinsons.  [facility] with [facility]     Also [facility] Annual Wellness and Personalized Prevention Plan:   Fall Risk + Home Safety + Hearing Impairment + Depression Screen + Cognitive Impairment Screen + Health Risk Assessment all reviewed    Health Maintenance Topics with due status: Not Due       Topic Last Completion Date    TETANUS VACCINE 04/15/2020    Lipid Panel 06/18/2024      The patient's Health Maintenance was reviewed and the following appears to be due at this time:   Health Maintenance Due   Topic Date Due    Hepatitis C Screening  Never done    Pneumococcal Vaccines (Age 65+) (1 of 2 - PCV) Never done    Shingles Vaccine (1 of 2) Never done    RSV Vaccine (Age 60+ and Pregnant patients) (1 - 1-dose 60+ series) Never done    Abdominal Aortic Aneurysm Screening  Never done    Colorectal Cancer Screening  01/24/2022    COVID-19 Vaccine (3 - 2023-24 season) 09/01/2023       Advance Care Planning   I attest to discussing Advance Care Planning with patient and/or family member.  Education was provided including the importance of the Health Care Power of , Advance Directives, and/or [place] documentation.  The patient expressed understanding to the importance of this information and discussion.  Length of ACP conversation in  minutes: 0       Opioid Screening: Patient medication list reviewed, patient is not taking prescription opioids. Patient is not using additional opioids than prescribed. Patient is at low risk of substance abuse based on this opioid use history.     No follow-ups on file. In addition to their scheduled follow up, the patient has also been instructed to follow up on as needed basis.

## 2024-07-15 DIAGNOSIS — J40 BRONCHITIS: ICD-10-CM

## 2024-07-16 RX ORDER — ALBUTEROL SULFATE 0.83 MG/ML
2.5 SOLUTION RESPIRATORY (INHALATION) 3 TIMES DAILY PRN
Qty: 180 ML | Refills: 1 | Status: SHIPPED | OUTPATIENT
Start: 2024-07-16 | End: 2025-07-16

## 2024-08-06 ENCOUNTER — TELEPHONE (OUTPATIENT)
Dept: INTERNAL MEDICINE | Facility: CLINIC | Age: 74
End: 2024-08-06
Payer: MEDICARE

## 2024-08-06 DIAGNOSIS — G20.B1 PARKINSON'S DISEASE WITH DYSKINESIA, UNSPECIFIED WHETHER MANIFESTATIONS FLUCTUATE: Primary | ICD-10-CM

## 2024-08-06 DIAGNOSIS — J40 BRONCHITIS: ICD-10-CM

## 2024-08-06 RX ORDER — CLINDAMYCIN HYDROCHLORIDE 300 MG/1
300 CAPSULE ORAL 3 TIMES DAILY
Qty: 21 CAPSULE | Refills: 0 | Status: SHIPPED | OUTPATIENT
Start: 2024-08-06 | End: 2024-08-13

## 2024-08-21 DIAGNOSIS — K21.9 GASTROESOPHAGEAL REFLUX DISEASE, UNSPECIFIED WHETHER ESOPHAGITIS PRESENT: ICD-10-CM

## 2024-08-21 RX ORDER — PANTOPRAZOLE SODIUM 40 MG/1
40 TABLET, DELAYED RELEASE ORAL DAILY
Qty: 90 TABLET | Refills: 3 | Status: SHIPPED | OUTPATIENT
Start: 2024-08-21 | End: 2025-08-21

## 2024-10-02 DIAGNOSIS — J40 BRONCHITIS: ICD-10-CM

## 2024-10-02 RX ORDER — ALBUTEROL SULFATE 0.83 MG/ML
2.5 SOLUTION RESPIRATORY (INHALATION) 3 TIMES DAILY PRN
Qty: 180 ML | Refills: 1 | Status: SHIPPED | OUTPATIENT
Start: 2024-10-02 | End: 2025-10-02

## 2024-10-04 ENCOUNTER — OFFICE VISIT (OUTPATIENT)
Dept: INTERNAL MEDICINE | Facility: CLINIC | Age: 74
End: 2024-10-04
Payer: MEDICARE

## 2024-10-04 DIAGNOSIS — J40 BRONCHITIS: Primary | ICD-10-CM

## 2024-10-04 RX ORDER — PIMAVANSERIN TARTRATE 34 MG/1
1 CAPSULE ORAL DAILY
COMMUNITY

## 2024-10-04 RX ORDER — AZITHROMYCIN 500 MG/1
500 TABLET, FILM COATED ORAL DAILY
Qty: 5 TABLET | Refills: 0 | Status: SHIPPED | OUTPATIENT
Start: 2024-10-04 | End: 2024-10-09

## 2024-10-04 RX ORDER — OLANZAPINE 15 MG/1
15 TABLET, ORALLY DISINTEGRATING ORAL NIGHTLY
COMMUNITY
Start: 2024-09-24

## 2024-10-04 NOTE — PROGRESS NOTES
Subjective:       Patient ID: Buzz Fontenot is a 73 y.o. male.    Chief Complaint: Cough    The patient location is: [facility]  The chief complaint leading to consultation is: Cough and congestion, Parkinsons    Visit type: audiovisual    Face to Face time with patient: 5 minutes  12 minutes of total time spent on the encounter, which includes face to face time and non-face to face time preparing to see the patient (eg, review of tests), Obtaining and/or reviewing separately obtained history, Documenting clinical information in the electronic or other health record, Independently interpreting results (not separately reported) and communicating results to the patient/family/caregiver, or Care coordination (not separately reported).         Each patient to whom he or she provides medical services by telemedicine is:  (1) informed of the relationship between the physician and patient and the respective role of any other health care provider with respect to management of the patient; and (2) notified that he or she may decline to receive medical services by telemedicine and may withdraw from such care at any time.    Notes:      73-year-old male is evaluated by telemedicine.  He has Parkinson's, so his wife is able to assist with the history.  He has had cough and congestion for the past 5 days without fever.  They have not yet tested him for COVID    Cough  This is a new problem. The current episode started in the past 7 days. The problem has been gradually worsening. The problem occurs constantly. The cough is Non-productive and productive of sputum. Associated symptoms include nasal congestion, rhinorrhea and shortness of breath. Pertinent negatives include no chest pain, chills, ear congestion, ear pain, fever, headaches, heartburn, hemoptysis, myalgias, postnasal drip, rash, sore throat, sweats, weight loss or wheezing. The symptoms are aggravated by lying down. He has tried OTC cough suppressant and a  beta-agonist inhaler for the symptoms. There is no history of asthma, bronchiectasis, bronchitis, COPD, emphysema, environmental allergies or pneumonia.     Review of Systems   Constitutional:  Negative for chills, fever and weight loss.   HENT:  Positive for rhinorrhea. Negative for ear pain, postnasal drip and sore throat.    Respiratory:  Positive for cough and shortness of breath. Negative for hemoptysis and wheezing.    Cardiovascular:  Negative for chest pain.   Gastrointestinal:  Negative for heartburn.   Musculoskeletal:  Negative for myalgias.   Integumentary:  Negative for rash.   Allergic/Immunologic: Negative for environmental allergies.   Neurological:  Negative for headaches.         Objective:      Physical Exam  Pulmonary:      Effort: No respiratory distress.         Vitals:    10/04/24 1100   BP: Comment: virtual visit unable to get vitals      Assessment:       Problem List Items Addressed This Visit    None  Visit Diagnoses       Bronchitis    -  Primary            Medication List with Changes/Refills   New Medications    AZITHROMYCIN (ZITHROMAX) 500 MG TABLET    Take 1 tablet (500 mg total) by mouth once daily. for 5 days   Current Medications    ALBUTEROL (PROAIR HFA) 90 MCG/ACTUATION INHALER    Inhale 2 puffs into the lungs every 6 (six) hours as needed for Wheezing. Rescue    ALBUTEROL (PROVENTIL) 2.5 MG /3 ML (0.083 %) NEBULIZER SOLUTION    Take 3 mLs (2.5 mg total) by nebulization 3 (three) times daily as needed for Wheezing. Rescue    CLONAZEPAM (KLONOPIN) 0.5 MG TABLET    Take 1 tablet (0.5 mg total) by mouth 2 (two) times daily.    CLONIDINE (CATAPRES) 0.1 MG TABLET    Take 1 tablet (0.1 mg total) by mouth every 6 (six) hours as needed (If systolic BP is greater than 180).    ESCITALOPRAM OXALATE (LEXAPRO) 5 MG/5 ML SOLN    Take 30 mLs by mouth once daily.    FLUDROCORTISONE (FLORINEF) 0.1 MG TAB    TAKE ONE TABLET BY MOUTH EVERY DAY    GLYCOPYRROLATE (ROBINUL) 1 MG TAB    Take 1 mg by  mouth 3 (three) times daily.    MELATONIN 10 MG CAP    Take 1 capsule by mouth every evening.    MIDODRINE (PROAMATINE) 5 MG TAB    Take 1 tablet (5 mg total) by mouth 2 (two) times daily.    OLANZAPINE ZYDIS (ZYPREXA) 15 MG TBDL    Take 15 mg by mouth every evening.    PANTOPRAZOLE (PROTONIX) 40 MG TABLET    Take 1 tablet (40 mg total) by mouth once daily.    PIMAVANSERIN (NUPLAZID) 34 MG CAP    Take 1 capsule by mouth Daily.    ROPINIROLE (REQUIP) 0.5 MG TABLET    Take 1 tablet (0.5 mg total) by mouth 2 (two) times daily.    TAMSULOSIN (FLOMAX) 0.4 MG CAP    TAKE 1 CAPSULE BY MOUTH DAILY AT BEDTIME...   Discontinued Medications    OLANZAPINE ZYDIS (ZYPREXA) 5 MG TBDL    Take 3 tablets (15 mg total) by mouth every evening.        Plan:       Bronchitis: Azithromycin daily for 5 days.  They will swab him at home for COVID today

## 2024-10-16 ENCOUNTER — TELEPHONE (OUTPATIENT)
Dept: INTERNAL MEDICINE | Facility: CLINIC | Age: 74
End: 2024-10-16
Payer: MEDICARE